# Patient Record
Sex: FEMALE | Race: WHITE | Employment: FULL TIME | URBAN - METROPOLITAN AREA
[De-identification: names, ages, dates, MRNs, and addresses within clinical notes are randomized per-mention and may not be internally consistent; named-entity substitution may affect disease eponyms.]

---

## 2017-01-20 ENCOUNTER — TELEPHONE (OUTPATIENT)
Dept: OBGYN CLINIC | Age: 47
End: 2017-01-20

## 2017-01-20 NOTE — TELEPHONE ENCOUNTER
Patient unable to read her email from 01/19/17. Gave patient the information for Dr. Day. Patient is going to contact mychart help center regarding her mychart complications.

## 2017-03-02 ENCOUNTER — OFFICE VISIT (OUTPATIENT)
Dept: OBGYN CLINIC | Age: 47
End: 2017-03-02

## 2017-03-02 ENCOUNTER — APPOINTMENT (OUTPATIENT)
Dept: MAMMOGRAPHY | Age: 47
End: 2017-03-02

## 2017-03-02 VITALS
WEIGHT: 213.8 LBS | SYSTOLIC BLOOD PRESSURE: 128 MMHG | BODY MASS INDEX: 31.67 KG/M2 | HEIGHT: 69 IN | DIASTOLIC BLOOD PRESSURE: 88 MMHG

## 2017-03-02 DIAGNOSIS — Z01.419 ENCOUNTER FOR GYNECOLOGICAL EXAMINATION WITHOUT ABNORMAL FINDING: Primary | ICD-10-CM

## 2017-03-02 DIAGNOSIS — Z12.31 ENCOUNTER FOR SCREENING MAMMOGRAM FOR MALIGNANT NEOPLASM OF BREAST: ICD-10-CM

## 2017-03-02 DIAGNOSIS — N95.1 MENOPAUSAL SYMPTOM: ICD-10-CM

## 2017-03-02 NOTE — PROGRESS NOTES
Dileep Ireland is a [de-identified] P5,  55 y.o. female [facility] whose No LMP recorded. Patient has had a hysterectomy. who presents for her annual checkup. She is having no significant problems. Patient will be having surgery to remove bartholin cysts on 3/16/17 by Dr. CARR [facility]. She is having terrible night sweats    Hormone Status:    She is  having vasomotor symptoms. The patient is using HRT: none    Sexual history:    She  reports that she currently engages in sexual activity and has had male partners. She reports using the following method of birth control/protection: Surgical.    Medical conditions:    Since her last annual GYN exam about one year ago, she has had the following changes in her health history: none. Pap and Mammogram History:    Her most recent Pap smear was normal, HPV negative 12/19/14     The patient had her mammogram today in our office. Breast Cancer History/Substance Abuse:    She has a family history of breast cancer. Osteoporosis History:    Family history does not include a first or second degree relative with osteopenia or osteoporosis. A bone density scan was not obtained        Past Medical History:   Diagnosis Date    Endometriosis     HSV (herpes simplex virus) infection      Past Surgical History:   Procedure Laterality Date    HX BARTHOLIN CYST MARSUPIALIZATION  4/09    HX RIGHT SALPINGO-OOPHORECTOMY  1/2000    [facility]/RSO    HX TOTAL ABDOMINAL HYSTERECTOMY  1/2000    RSO     Tobacco History:  reports that she has been smoking. She has been smoking about 0.50 packs per day. She has never used smokeless tobacco.  Alcohol Abuse:  reports that she does not drink alcohol. Drug Abuse:  reports that she does not use illicit drugs. Current Outpatient Prescriptions   Medication Sig Dispense Refill    valACYclovir (VALTREX) 500 mg tablet Take 1 Tab by mouth daily.  90 Tab 3     Allergies: Percocet [oxycodone-acetaminophen]   Social History     Social History    Marital status:      Spouse name: N/A    Number of children: N/A    Years of education: N/A     Occupational History    Not on file.      Social History Main Topics    Smoking status: Current Every Day Smoker     Packs/day: 0.50    Smokeless tobacco: Never Used    Alcohol use No    Drug use: No    Sexual activity: Yes     Partners: Male     Birth control/ protection: Surgical     Other Topics Concern    Not on file     Social History Narrative     Patient Active Problem List   Diagnosis Code    Ectopic breast tissue Q83.8       Review of Systems - History obtained from the patient  Constitutional: negative for weight loss, fever, night sweats  HEENT: negative for hearing loss, earache, congestion, snoring, sorethroat  CV: negative for chest pain, palpitations, edema  Resp: negative for cough, shortness of breath, wheezing  GI: negative for change in bowel habits, abdominal pain, black or bloody stools  : negative for frequency, dysuria, hematuria, vaginal discharge  MSK: negative for back pain, joint pain, muscle pain  Breast: negative for breast lumps, nipple discharge, galactorrhea  Skin :negative for itching, rash, hives  Neuro: negative for dizziness, headache, confusion, weakness  Psych: negative for anxiety, depression, change in mood  Heme/lymph: negative for bleeding, bruising, pallor    Physical Exam    Visit Vitals    /88    Ht 5' 9\" (1.753 m)    Wt 213 lb 12.8 oz (97 kg)    BMI 31.57 kg/m2     Constitutional  · Appearance: well-nourished, well developed, alert, in no acute distress    HENT  · Head and Face: appears normal    Neck  · Inspection/Palpation: normal appearance, no masses or tenderness  · Lymph Nodes: no lymphadenopathy present  · Thyroid: gland size normal, nontender, no nodules or masses present on palpation    Chest  · Respiratory Effort: breathing normal        Auscultation: normal breath sounds    Cardiovascular  · Heart:  · Auscultation: regular rate and rhythm without murmur    Breasts  · Inspection of Breasts: breasts symmetrical, no skin changes, no discharge present, nipple appearance normal, no skin retraction present  · Palpation of Breasts and Axillae: no masses present on palpation, no breast tenderness  · Axillary Lymph Nodes: no lymphadenopathy present    Gastrointestinal  · Abdominal Examination: abdomen non-tender to palpation, normal bowel sounds, no masses present  · Liver and spleen: no hepatomegaly present, spleen not palpable  · Hernias: no hernias identified    Genitourinary  · External Genitalia: normal appearance for age, no discharge present, no tenderness present, no inflammatory lesions present, no masses present, no atrophy present  · Vagina: normal vaginal vault without central or paravaginal defects, no discharge present, no inflammatory lesions present, no masses present, large left BC  · Bladder: non-tender to palpation  · Urethra: appears normal  · Cervix: absent  · Uterus: absent  · Adnexa: no adnexal tenderness present, no adnexal masses present  · Perineum: perineum within normal limits, no evidence of trauma, no rashes or skin lesions present  · Anus: anus within normal limits, no hemorrhoids present  · Inguinal Lymph Nodes: no lymphadenopathy present      Skin  · General Inspection: no rash, no lesions identified    Neurologic/Psychiatric  · Mental Status:  · Orientation: grossly oriented to person, place and time  · Mood and Affect: mood normal, affect appropriate    . Assessment:  Routine gynecologic examination  Her current medical status is satisfactory with no evidence of significant gynecologic issues.   Menopausal sx  Left Bartholin cyst  Plan:  Counseled re: diet, exercise, healthy lifestyle  Return for yearly wellness visits  Rec annual mammogram  Check FSH/LH/estradiol - given Divigel coupon  Have BC removed in March by Dr. Christiano Hensley

## 2017-03-02 NOTE — PATIENT INSTRUCTIONS
Breast Self-Exam: Care Instructions  Your Care Instructions  A breast self-exam is when you check your breasts for lumps or changes. This regular exam helps you learn how your breasts normally look and feel. Most breast problems or changes are not because of cancer. Breast self-exam is not a substitute for a mammogram. Having regular breast exams by your doctor and regular mammograms improve your chances of finding any problems with your breasts. Some women set a time each month to do a step-by-step breast self-exam. Other women like a less formal system. They might look at their breasts as they brush their teeth, or feel their breasts once in a while in the shower. If you notice a change in your breast, tell your doctor. Follow-up care is a key part of your treatment and safety. Be sure to make and go to all appointments, and call your doctor if you are having problems. Its also a good idea to know your test results and keep a list of the medicines you take. How do you do a breast self-exam?  · The best time to examine your breasts is usually one week after your menstrual period begins. Your breasts should not be tender then. If you do not have periods, you might do your exam on a day of the month that is easy to remember. · To examine your breasts:  ¨ Remove all your clothes above the waist and lie down. When you are lying down, your breast tissue spreads evenly over your chest wall, which makes it easier to feel all your breast tissue. ¨ Use the pads--not the fingertips--of the 3 middle fingers of your left hand to check your right breast. Move your fingers slowly in small coin-sized circles that overlap. ¨ Use three levels of pressure to feel of all your breast tissue. Use light pressure to feel the tissue close to the skin surface. Use medium pressure to feel a little deeper. Use firm pressure to feel your tissue close to your breastbone and ribs.  Use each pressure level to feel your breast tissue before moving on to the next spot. ¨ Check your entire breast, moving up and down as if following a strip from the collarbone to the bra line, and from the armpit to the ribs. Repeat until you have covered the entire breast.  ¨ Repeat this procedure for your left breast, using the pads of the 3 middle fingers of your right hand. · To examine your breasts while in the shower:  ¨ Place one arm over your head and lightly soap your breast on that side. ¨ Using the pads of your fingers, gently move your hand over your breast (in the strip pattern described above), feeling carefully for any lumps or changes. ¨ Repeat for the other breast.  · Have your doctor inspect anything you notice to see if you need further testing. Where can you learn more? Go to http://crispin-francisco.info/. Enter P148 in the search box to learn more about \"Breast Self-Exam: Care Instructions. \"  Current as of: July 26, 2016  Content Version: 11.1  © 9383-8439 [employer]. Care instructions adapted under license by [employer] (which disclaims liability or warranty for this information). If you have questions about a medical condition or this instruction, always ask your healthcare professional. [place] [address] any warranty or liability for your use of this information.

## 2017-03-03 LAB
ESTRADIOL SERPL-MCNC: 79.5 PG/ML
FSH SERPL-ACNC: 74.5 MIU/ML
LH SERPL-ACNC: 54.6 MIU/ML

## 2017-03-06 ENCOUNTER — TELEPHONE (OUTPATIENT)
Dept: OBGYN CLINIC | Age: 47
End: 2017-03-06

## 2017-03-06 NOTE — TELEPHONE ENCOUNTER
Lexx called from [address] [place] [address] requesting CBC results on this patient. Stated the patient declined blood work done due to her having it it done here. Informed her that the patient only had hormones levels drawn here. She verbalized understanding.

## 2018-03-15 ENCOUNTER — OFFICE VISIT (OUTPATIENT)
Dept: OBGYN CLINIC | Age: 48
End: 2018-03-15

## 2018-03-15 VITALS
DIASTOLIC BLOOD PRESSURE: 70 MMHG | SYSTOLIC BLOOD PRESSURE: 130 MMHG | HEIGHT: 69 IN | WEIGHT: 220 LBS | BODY MASS INDEX: 32.58 KG/M2

## 2018-03-15 DIAGNOSIS — Z01.419 ENCOUNTER FOR GYNECOLOGICAL EXAMINATION (GENERAL) (ROUTINE) WITHOUT ABNORMAL FINDINGS: Primary | ICD-10-CM

## 2018-03-15 DIAGNOSIS — N95.1 MENOPAUSAL SYMPTOM: ICD-10-CM

## 2018-03-15 RX ORDER — MOMETASONE FUROATE AND FORMOTEROL FUMARATE DIHYDRATE 100; 5 UG/1; UG/1
AEROSOL RESPIRATORY (INHALATION)
Refills: 4 | COMMUNITY
Start: 2018-01-23

## 2018-03-15 NOTE — PATIENT INSTRUCTIONS
Breast Self-Exam: Care Instructions  Your Care Instructions    A breast self-exam is when you check your breasts for lumps or changes. This regular exam helps you learn how your breasts normally look and feel. Most breast problems or changes are not because of cancer. Breast self-exam is not a substitute for a mammogram. Having regular breast exams by your doctor and regular mammograms improve your chances of finding any problems with your breasts. Some women set a time each month to do a step-by-step breast self-exam. Other women like a less formal system. They might look at their breasts as they brush their teeth, or feel their breasts once in a while in the shower. If you notice a change in your breast, tell your doctor. Follow-up care is a key part of your treatment and safety. Be sure to make and go to all appointments, and call your doctor if you are having problems. It's also a good idea to know your test results and keep a list of the medicines you take. How do you do a breast self-exam?  · The best time to examine your breasts is usually one week after your menstrual period begins. Your breasts should not be tender then. If you do not have periods, you might do your exam on a day of the month that is easy to remember. · To examine your breasts:  ¨ Remove all your clothes above the waist and lie down. When you are lying down, your breast tissue spreads evenly over your chest wall, which makes it easier to feel all your breast tissue. ¨ Use the pads-not the fingertips-of the 3 middle fingers of your left hand to check your right breast. Move your fingers slowly in small coin-sized circles that overlap. ¨ Use three levels of pressure to feel of all your breast tissue. Use light pressure to feel the tissue close to the skin surface. Use medium pressure to feel a little deeper. Use firm pressure to feel your tissue close to your breastbone and ribs.  Use each pressure level to feel your breast tissue before moving on to the next spot. ¨ Check your entire breast, moving up and down as if following a strip from the collarbone to the bra line, and from the armpit to the ribs. Repeat until you have covered the entire breast.  ¨ Repeat this procedure for your left breast, using the pads of the 3 middle fingers of your right hand. · To examine your breasts while in the shower:  ¨ Place one arm over your head and lightly soap your breast on that side. ¨ Using the pads of your fingers, gently move your hand over your breast (in the strip pattern described above), feeling carefully for any lumps or changes. ¨ Repeat for the other breast.  · Have your doctor inspect anything you notice to see if you need further testing. Where can you learn more? Go to http://crispin-francisco.info/. Enter P148 in the search box to learn more about \"Breast Self-Exam: Care Instructions. \"  Current as of: May 12, 2017  Content Version: 11.4  © 6643-7253 [employer]. Care instructions adapted under license by [employer] (which disclaims liability or warranty for this information). If you have questions about a medical condition or this instruction, always ask your healthcare professional. [place] [address] any warranty or liability for your use of this information.

## 2018-03-15 NOTE — PROGRESS NOTES
Stuart Handley is a [de-identified] P5,  52 y.o. female [facility] whose No LMP recorded. Patient has had a hysterectomy. who presents for her annual checkup. She is having no significant problems. Hormone Status:    She is  having vasomotor symptoms. Has one ovary left. Hot flashes, not sleeping    The patient is not using HRT. Sexual history:    She  reports that she currently engages in sexual activity and has had male partners. She reports using the following method of birth control/protection: Surgical.    Medical conditions:    Since her last annual GYN exam about one year ago, she has had the following changes in her health history: Dx with COPD this month. Just had an ingrown toe nail removed a few days ago. Pap and Mammogram History:    Her most recent Pap smear was normal, HPV neg obtained 12/19/2014. The patient had her mammogram today in our office. Breast Cancer History/Substance Abuse:    She has a family history of breast cancer. Osteoporosis History:    Family history does not include a first or second degree relative with osteopenia or osteoporosis. A bone density scan was not previously obtained. She is not currently taking calcium and vit D. Past Medical History:   Diagnosis Date    Chronic obstructive pulmonary disease ([facility] [address] 03/2018    Endometriosis     HSV (herpes simplex virus) infection      Past Surgical History:   Procedure Laterality Date    HX BARTHOLIN CYST MARSUPIALIZATION  4/09    HX RIGHT SALPINGO-OOPHORECTOMY  1/2000    [facility]/RSO    HX TOTAL ABDOMINAL HYSTERECTOMY  1/2000    RSO     Tobacco History:  reports that she has been smoking. She has been smoking about 0.50 packs per day. She has never used smokeless tobacco.  Alcohol Abuse:  reports that she does not drink alcohol. Drug Abuse:  reports that she does not use illicit drugs.   Current Outpatient Prescriptions   Medication Sig Dispense Refill    DULERA 100-5 mcg/actuation HFA inhaler TAKE 2 PUFFS EVERY 12 HOURS  4     Allergies: Percocet [oxycodone-acetaminophen]   Social History     Social History    Marital status:      Spouse name: N/A    Number of children: N/A    Years of education: N/A     Occupational History    Not on file.      Social History Main Topics    Smoking status: Current Every Day Smoker     Packs/day: 0.50    Smokeless tobacco: Never Used    Alcohol use No    Drug use: No    Sexual activity: Yes     Partners: Male     Birth control/ protection: Surgical     Other Topics Concern    Not on file     Social History Narrative     Patient Active Problem List   Diagnosis Code    Ectopic breast tissue Q83.8       Review of Systems - History obtained from the patient  Constitutional: negative for weight loss, fever, night sweats  HEENT: negative for hearing loss, earache, congestion, snoring, sorethroat  CV: negative for chest pain, palpitations, edema  Resp: negative for cough, shortness of breath, wheezing  GI: negative for change in bowel habits, abdominal pain, black or bloody stools  : negative for frequency, dysuria, hematuria, vaginal discharge  MSK: negative for back pain, joint pain, muscle pain  Breast: negative for breast lumps, nipple discharge, galactorrhea  Skin :negative for itching, rash, hives  Neuro: negative for dizziness, headache, confusion, weakness  Psych: negative for anxiety, depression, change in mood  Heme/lymph: negative for bleeding, bruising, pallor    Physical Exam    Visit Vitals    /70 (BP 1 Location: Left arm, BP Patient Position: Sitting)    Ht 5' 9\" (1.753 m)    Wt 220 lb (99.8 kg)    BMI 32.49 kg/m2     Constitutional  · Appearance: well-nourished, well developed, alert, in no acute distress    HENT  · Head and Face: appears normal    Neck  · Inspection/Palpation: normal appearance, no masses or tenderness  · Lymph Nodes: no lymphadenopathy present  · Thyroid: gland size normal, nontender, no nodules or masses present on palpation    Chest  · Respiratory Effort: breathing normal        Auscultation: normal breath sounds    Cardiovascular  · Heart:  · Auscultation: regular rate and rhythm without murmur    Breasts  · Inspection of Breasts: breasts symmetrical, no skin changes, no discharge present, nipple appearance normal, no skin retraction present  · Palpation of Breasts and Axillae: no masses present on palpation, no breast tenderness  · Axillary Lymph Nodes: no lymphadenopathy present    Gastrointestinal  · Abdominal Examination: abdomen non-tender to palpation, normal bowel sounds, no masses present  · Liver and spleen: no hepatomegaly present, spleen not palpable  · Hernias: no hernias identified    Genitourinary  · External Genitalia: normal appearance for age, no discharge present, no tenderness present, no inflammatory lesions present, no masses present, no atrophy present  · Vagina: normal vaginal vault without central or paravaginal defects, no discharge present, no inflammatory lesions present, no masses present  · Bladder: non-tender to palpation  · Urethra: appears normal  · Cervix: absent  · Uterus: absent  · Adnexa: no adnexal tenderness present, no adnexal masses present  · Perineum: perineum within normal limits, no evidence of trauma, no rashes or skin lesions present  · Anus: anus within normal limits, no hemorrhoids present  · Inguinal Lymph Nodes: no lymphadenopathy present      Skin  · General Inspection: no rash, no lesions identified    Neurologic/Psychiatric  · Mental Status:  · Orientation: grossly oriented to person, place and time  · Mood and Affect: mood normal, affect appropriate    . Assessment:  Routine gynecologic examination  Her current medical status is satisfactory with no evidence of significant gynecologic issues.   Menopause sx    Plan:  Counseled re: diet, exercise, healthy lifestyle  Return for yearly wellness visits  Rec annual mammogram  Start Divigel - disc risk of DVT   See Dr. Robles Covert for weight loss

## 2018-10-03 ENCOUNTER — OFFICE VISIT (OUTPATIENT)
Dept: OBGYN CLINIC | Age: 48
End: 2018-10-03

## 2018-10-03 VITALS — BODY MASS INDEX: 32.88 KG/M2 | HEIGHT: 69 IN | WEIGHT: 222 LBS

## 2018-10-03 DIAGNOSIS — N90.89 VULVAR LESION: Primary | ICD-10-CM

## 2018-10-03 NOTE — PROGRESS NOTES
Vulvar lesion evaluation    Lydia Haley is a 50 y.o. female  No LMP recorded. Patient has had a hysterectomy. who presents with 3 black small bumps on her left labia. She noticed it 1 week ago. The lesion has shown the following change: 2 days ago she noticed a third bump. She states 1 week ago there was swelling but it went away. She reports the following associated symptoms: minor discomfort/irritation when walking or wearing pants. .    She denies the following associated symptoms: itching, pain, redness, drainage, irritation. Aggravating factors: none  Alleviating factors: none. Past Medical History:   Diagnosis Date    Chronic obstructive pulmonary disease ([facility] [address] 03/2018    Endometriosis     HSV (herpes simplex virus) infection      Past Surgical History:   Procedure Laterality Date    HX BARTHOLIN CYST MARSUPIALIZATION  4/09    HX RIGHT SALPINGO-OOPHORECTOMY  1/2000    [facility]/RSO    HX TOTAL ABDOMINAL HYSTERECTOMY  1/2000    RSO     Social History     Occupational History    Not on file. Social History Main Topics    Smoking status: Current Every Day Smoker     Packs/day: 0.50    Smokeless tobacco: Never Used    Alcohol use No    Drug use: No    Sexual activity: Yes     Partners: Male     Birth control/ protection: Surgical     Family History   Problem Relation Age of Onset    Breast Cancer Mother     Diabetes Mother     Hypertension Mother     Breast Cancer Maternal Grandmother        Allergies   Allergen Reactions    Percocet [Oxycodone-Acetaminophen] Other (comments)     headache     Prior to Admission medications    Medication Sig Start Date End Date Taking?  Authorizing Provider   Pao Ocasio 100-5 mcg/actuation HFA inhaler TAKE 2 PUFFS EVERY 12 HOURS 1/23/18  Yes Historical Provider        Review of Systems: History obtained from the patient  Constitutional: negative for weight loss, fever, night sweats  GI: negative for change in bowel habits, abdominal pain, black or bloody stools  : negative for frequency, dysuria, hematuria, vaginal discharge  MSK: negative for back pain, joint pain, muscle pain  Skin: negative for itching, rash, hives elsewhere  Neuro: negative for dizziness, headache, confusion, weakness  Psych: negative for anxiety, depression, change in mood  Heme/lymph: negative for bleeding, bruising, pallor    Objective:  Visit Vitals    Ht 5' 9\" (1.753 m)    Wt 222 lb (100.7 kg)    BMI 32.78 kg/m2       Physical Exam:   PHYSICAL EXAMINATION    Constitutional  · Appearance: well-nourished, well developed, alert, in no acute distress    Gastrointestinal  · Abdominal Examination: abdomen non-tender to palpation, normal bowel sounds, no masses present  · Liver and spleen: no hepatomegaly present, spleen not palpable  · Hernias: no hernias identified    Genitourinary  · External Genitalia: normal appearance for age, no discharge present, no tenderness present, no inflammatory lesions present, no masses present, no atrophy present, 3 small hemangiomas on left vulva  · Perineum: perineum within normal limits, no evidence of trauma, no rashes or skin lesions present  · Anus: anus within normal limits, no hemorrhoids present  · Inguinal Lymph Nodes: no lymphadenopathy present    Skin  · General Inspection: no rash, no lesions identified    Neurologic/Psychiatric  · Mental Status:  · Orientation: grossly oriented to person, place and time  · Mood and Affect: mood normal, affect appropriate    Assessment:   Benign hemangiomas    Plan:   AE      RTO prn if symptoms persist or worsen. Instructions given to pt. Handouts given to pt.

## 2018-12-31 ENCOUNTER — OFFICE VISIT (OUTPATIENT)
Dept: OBGYN CLINIC | Age: 48
End: 2018-12-31

## 2018-12-31 VITALS — DIASTOLIC BLOOD PRESSURE: 78 MMHG | SYSTOLIC BLOOD PRESSURE: 128 MMHG

## 2018-12-31 DIAGNOSIS — N89.8 VAGINAL ITCHING: Primary | ICD-10-CM

## 2018-12-31 DIAGNOSIS — N89.8 VAGINAL DISCHARGE: ICD-10-CM

## 2018-12-31 NOTE — PROGRESS NOTES
Vaginitis evaluation    Chief Complaint   Vaginitis      HPI  50 y.o. female complains of  vaginal discharge for itching for several days. No LMP recorded. Patient has had a hysterectomy. She denies additional symptoms at this time. The patient denies aggravating factors. She is not concerned about possible STI exposure at this time. She denies exposure to new chemicals ot hygenic agents  Previous treatment included: Diflucan x2  She states she bought a at home yeast kit, tested positive for yeast, PCP gave her diflucan. She states the itching has improved. She c/o vaginal discomfort and feels like she has a bulge. When she looks she can see something discolored/blackish in her vagina near where she had a bartholin cyst removed 2 years ago. Past Medical History:   Diagnosis Date    Chronic obstructive pulmonary disease ([facility] [address] 03/2018    Endometriosis     HSV (herpes simplex virus) infection      Past Surgical History:   Procedure Laterality Date    HX BARTHOLIN CYST MARSUPIALIZATION  4/09    HX RIGHT SALPINGO-OOPHORECTOMY  1/2000    [place]/RSO    HX TOTAL ABDOMINAL HYSTERECTOMY  1/2000    RSO     Social History     Occupational History    Not on file   Tobacco Use    Smoking status: Current Every Day Smoker     Packs/day: 0.50    Smokeless tobacco: Never Used   Substance and Sexual Activity    Alcohol use: No     Alcohol/week: 0.0 oz    Drug use: No    Sexual activity: Yes     Partners: Male     Birth control/protection: Surgical     Family History   Problem Relation Age of Onset    Breast Cancer Mother     Diabetes Mother     Hypertension Mother     Breast Cancer Maternal Grandmother         Allergies   Allergen Reactions    Percocet [Oxycodone-Acetaminophen] Other (comments)     headache     Prior to Admission medications    Medication Sig Start Date End Date Taking?  Authorizing Provider   DULERA 100-5 mcg/actuation HFA inhaler TAKE 2 PUFFS EVERY 12 HOURS 1/23/18  Yes Provider, Historical Review of Systems - History obtained from the patient  Constitutional: negative for weight loss, fever, night sweats  Breast: negative for breast lumps, nipple discharge, galactorrhea  GI: negative for change in bowel habits, abdominal pain, black or bloody stools  : negative for frequency, dysuria, hematuria  MSK: negative for back pain, joint pain, muscle pain  Skin: negative for itching, rash, hives  Neuro: negative for dizziness, headache, confusion, weakness  Psych: negative for anxiety, depression, change in mood  Heme/lymph: negative for bleeding, bruising, pallor       Objective:    Visit Vitals  /78       Physical Exam:   PHYSICAL EXAMINATION    Constitutional  · Appearance: well-nourished, well developed, alert, in no acute distress    HENT  · Head and Face: appears normal    Genitourinary  · External Genitalia: normal appearance for age, no discharge present, no tenderness present, no inflammatory lesions present, no masses present, no atrophy present  · Vagina:  min discharge present, otherwise normal vaginal vault without central or paravaginal defects, no inflammatory lesions present, no masses present  · Bladder: non-tender to palpation  · Urethra: appears normal  · Cervix: normal   · Uterus: normal size, shape and consistency  · Adnexa: no adnexal tenderness present, no adnexal masses present  · Perineum: perineum within normal limits, no evidence of trauma, no rashes or skin lesions present  · Anus: anus within normal limits, no hemorrhoids present  · Inguinal Lymph Nodes: no lymphadenopathy present    Skin  · General Inspection: no rash, no lesions identified    Neurologic/Psychiatric  · Mental Status:  · Orientation: grossly oriented to person, place and time  · Mood and Affect: mood normal, affect appropriate      .         Assessment:   Vaginal swelling - nonexistent - pt reassured normal exam  Hx of yeast    Plan:   nuswab sent    ROV prn if symptoms persist or worsen.

## 2019-01-03 LAB
A VAGINAE DNA VAG QL NAA+PROBE: NORMAL SCORE
BVAB2 DNA VAG QL NAA+PROBE: NORMAL SCORE
C ALBICANS DNA VAG QL NAA+PROBE: NEGATIVE
C GLABRATA DNA VAG QL NAA+PROBE: NEGATIVE
MEGA1 DNA VAG QL NAA+PROBE: NORMAL SCORE
T VAGINALIS RRNA SPEC QL NAA+PROBE: NEGATIVE

## 2019-05-03 ENCOUNTER — OFFICE VISIT (OUTPATIENT)
Dept: OBGYN CLINIC | Age: 49
End: 2019-05-03

## 2019-05-03 VITALS
SYSTOLIC BLOOD PRESSURE: 128 MMHG | HEIGHT: 69 IN | DIASTOLIC BLOOD PRESSURE: 80 MMHG | WEIGHT: 211.6 LBS | BODY MASS INDEX: 31.34 KG/M2

## 2019-05-03 DIAGNOSIS — Z01.419 ENCOUNTER FOR GYNECOLOGICAL EXAMINATION WITHOUT ABNORMAL FINDING: Primary | ICD-10-CM

## 2019-05-03 RX ORDER — HYDROCHLOROTHIAZIDE 12.5 MG/1
TABLET ORAL
Refills: 2 | COMMUNITY
Start: 2019-01-30 | End: 2021-04-29

## 2019-05-03 NOTE — PATIENT INSTRUCTIONS

## 2019-05-03 NOTE — PROGRESS NOTES
Keyonna Concepcion is a [de-identified] ,  50 y.o. female [facility] whose had a hysterectomy. who presents for her annual checkup. She is having no significant problems. She thinks she may have had a bartolin cyst on right. Left has been removed Hormone Status: 
 
She is not having vasomotor symptoms. The patient is not using HRT: Patient states the Divigel was causing leg pains. She stopped using it and felt better. Sexual history: She  reports that she currently engages in sexual activity and has had partners who are Male. She reports using the following method of birth control/protection: Surgical. 
 
Medical conditions: 
 
Since her last annual GYN exam about one year ago, she has had the following changes in her health history: Diagnosed with HTN Pap and Mammogram History: 
 
Her most recent Pap smear was normal, HPV neg obtained 12/19/2014. The patient had her mammogram today in our office. Breast Cancer History/Substance Abuse: She has a family history of breast cancer. Osteoporosis History: 
 
Family history does not include a first or second degree relative with osteopenia or osteoporosis. She is currently taking calcium and vit D. Past Medical History:  
Diagnosis Date  Chronic obstructive pulmonary disease ([facility] [address] 03/2018  Endometriosis  HSV (herpes simplex virus) infection  Hypertension Past Surgical History:  
Procedure Laterality Date  HX BARTHOLIN CYST MARSUPIALIZATION  4/09  HX RIGHT SALPINGO-OOPHORECTOMY  1/2000 [place]/RSO  HX TOTAL ABDOMINAL HYSTERECTOMY  1/2000 RSO Tobacco History:  reports that she has been smoking. She has been smoking about 0.50 packs per day. She has never used smokeless tobacco. 
Alcohol Abuse:  reports that she does not drink alcohol. Drug Abuse:  reports that she does not use drugs. Current Outpatient Medications Medication Sig Dispense Refill  hydroCHLOROthiazide (HYDRODIURIL) 12.5 mg tablet TAKE 1 TABLET BY MOUTH EVERY MORNING  2  
 DULERA 100-5 mcg/actuation HFA inhaler TAKE 2 PUFFS EVERY 12 HOURS  4 Allergies: Percocet [oxycodone-acetaminophen] Social History Socioeconomic History  Marital status:  Spouse name: Not on file  Number of children: Not on file  Years of education: Not on file  Highest education level: Not on file Occupational History  Not on file Social Needs  Financial resource strain: Not on file  Food insecurity:  
  Worry: Not on file Inability: Not on file  Transportation needs:  
  Medical: Not on file Non-medical: Not on file Tobacco Use  Smoking status: Current Every Day Smoker Packs/day: 0.50  Smokeless tobacco: Never Used Substance and Sexual Activity  Alcohol use: No  
  Alcohol/week: 0.0 oz  Drug use: No  
 Sexual activity: Yes  
  Partners: Male Birth control/protection: Surgical  
Lifestyle  Physical activity:  
  Days per week: Not on file Minutes per session: Not on file  Stress: Not on file Relationships  Social connections:  
  Talks on phone: Not on file Gets together: Not on file Attends Sabianism service: Not on file Active member of club or organization: Not on file Attends meetings of clubs or organizations: Not on file Relationship status: Not on file  Intimate partner violence:  
  Fear of current or ex partner: Not on file Emotionally abused: Not on file Physically abused: Not on file Forced sexual activity: Not on file Other Topics Concern  Not on file Social History Narrative  Not on file Patient Active Problem List  
Diagnosis Code  Ectopic breast tissue Q83.8 Review of Systems - History obtained from the patient Constitutional: negative for weight loss, fever, night sweats HEENT: negative for hearing loss, earache, congestion, snoring, sorethroat CV: negative for chest pain, palpitations, edema Resp: negative for cough, shortness of breath, wheezing GI: negative for change in bowel habits, abdominal pain, black or bloody stools : negative for frequency, dysuria, hematuria, vaginal discharge MSK: negative for back pain, joint pain, muscle pain Breast: negative for breast lumps, nipple discharge, galactorrhea Skin :negative for itching, rash, hives Neuro: negative for dizziness, headache, confusion, weakness Psych: negative for anxiety, depression, change in mood Heme/lymph: negative for bleeding, bruising, pallor Physical Exam 
 
Visit Vitals /80 (BP 1 Location: Right arm, BP Patient Position: Sitting) Ht 5' 9\" (1.753 m) Wt 211 lb 9.6 oz (96 kg) BMI 31.25 kg/m² Constitutional 
· Appearance: well-nourished, well developed, alert, in no acute distress HENT 
· Head and Face: appears normal 
 
Neck · Inspection/Palpation: normal appearance, no masses or tenderness · Lymph Nodes: no lymphadenopathy present · Thyroid: gland size normal, nontender, no nodules or masses present on palpation Chest 
· Respiratory Effort: breathing normal 
      Auscultation: normal breath sounds Cardiovascular · Heart: 
· Auscultation: regular rate and rhythm without murmur Breasts · Inspection of Breasts: breasts symmetrical, no skin changes, no discharge present, nipple appearance normal, no skin retraction present · Palpation of Breasts and Axillae: no masses present on palpation, no breast tenderness · Axillary Lymph Nodes: no lymphadenopathy present Gastrointestinal 
· Abdominal Examination: abdomen non-tender to palpation, normal bowel sounds, no masses present · Liver and spleen: no hepatomegaly present, spleen not palpable · Hernias: no hernias identified Genitourinary · External Genitalia: normal appearance for age, no discharge present, no tenderness present, no inflammatory lesions present, no masses present, no atrophy present · Vagina: normal vaginal vault without central or paravaginal defects, no discharge present, no inflammatory lesions present, no masses present · Bladder: non-tender to palpation · Urethra: appears normal 
· Cervix: absent · Uterus: absent · Adnexa: no adnexal tenderness present, no adnexal masses present · Perineum: perineum within normal limits, no evidence of trauma, no rashes or skin lesions present · Anus: anus within normal limits, no hemorrhoids present · Inguinal Lymph Nodes: no lymphadenopathy present Skin · General Inspection: no rash, no lesions identified Neurologic/Psychiatric · Mental Status: · Orientation: grossly oriented to person, place and time · Mood and Affect: mood normal, affect appropriate Presley Reasoner Assessment: 
Routine gynecologic examination Her current medical status is satisfactory with no evidence of significant gynecologic issues. Plan: 
Counseled re: diet, exercise, healthy lifestyle Return for yearly wellness visits Rec annual mammogram 
Pap RTO if cyst recurs

## 2019-05-08 LAB
CYTOLOGIST CVX/VAG CYTO: NORMAL
CYTOLOGY CVX/VAG DOC CYTO: NORMAL
CYTOLOGY CVX/VAG DOC THIN PREP: NORMAL
CYTOLOGY HISTORY:: NORMAL
DX ICD CODE: NORMAL
LABCORP, 190119: NORMAL
Lab: NORMAL
OTHER STN SPEC: NORMAL
STAT OF ADQ CVX/VAG CYTO-IMP: NORMAL

## 2019-09-04 RX ORDER — VALACYCLOVIR HYDROCHLORIDE 500 MG/1
500 TABLET, FILM COATED ORAL 2 TIMES DAILY
Qty: 90 TAB | Refills: 4 | Status: CANCELLED | OUTPATIENT
Start: 2019-09-04

## 2019-10-22 ENCOUNTER — HOSPITAL ENCOUNTER (EMERGENCY)
Age: 49
Discharge: HOME | End: 2019-10-22
Attending: EMERGENCY MEDICINE
Payer: COMMERCIAL

## 2019-10-22 ENCOUNTER — APPOINTMENT (OUTPATIENT)
Dept: ULTRASOUND IMAGING | Age: 49
End: 2019-10-22
Attending: EMERGENCY MEDICINE
Payer: COMMERCIAL

## 2019-10-22 VITALS
TEMPERATURE: 97.8 F | SYSTOLIC BLOOD PRESSURE: 124 MMHG | DIASTOLIC BLOOD PRESSURE: 67 MMHG | HEART RATE: 103 BPM | OXYGEN SATURATION: 100 % | RESPIRATION RATE: 18 BRPM

## 2019-10-22 DIAGNOSIS — N30.01 ACUTE CYSTITIS WITH HEMATURIA: Primary | ICD-10-CM

## 2019-10-22 LAB
ALBUMIN SERPL-MCNC: 4.1 G/DL (ref 3.5–5)
ALBUMIN/GLOB SERPL: 1.2 {RATIO} (ref 1.1–2.2)
ALP SERPL-CCNC: 67 U/L (ref 45–117)
ALT SERPL-CCNC: 30 U/L (ref 12–78)
ANION GAP SERPL CALC-SCNC: 11 MMOL/L (ref 5–15)
APPEARANCE UR: ABNORMAL
APTT PPP: 23.6 SEC (ref 22.1–32)
AST SERPL-CCNC: 23 U/L (ref 15–37)
BACTERIA URNS QL MICRO: ABNORMAL /HPF
BASOPHILS # BLD: 0 K/UL (ref 0–0.1)
BASOPHILS NFR BLD: 0 % (ref 0–1)
BILIRUB SERPL-MCNC: 0.3 MG/DL (ref 0.2–1)
BILIRUB UR QL: NEGATIVE
BUN SERPL-MCNC: 13 MG/DL (ref 6–20)
BUN/CREAT SERPL: 12 (ref 12–20)
CALCIUM SERPL-MCNC: 9.8 MG/DL (ref 8.5–10.1)
CHLORIDE SERPL-SCNC: 103 MMOL/L (ref 97–108)
CO2 SERPL-SCNC: 25 MMOL/L (ref 21–32)
COLOR UR: ABNORMAL
CREAT SERPL-MCNC: 1.11 MG/DL (ref 0.55–1.02)
DIFFERENTIAL METHOD BLD: ABNORMAL
EOSINOPHIL # BLD: 0.1 K/UL (ref 0–0.4)
EOSINOPHIL NFR BLD: 1 % (ref 0–7)
EPITH CASTS URNS QL MICRO: ABNORMAL /LPF
ERYTHROCYTE [DISTWIDTH] IN BLOOD BY AUTOMATED COUNT: 13.2 % (ref 11.5–14.5)
GLOBULIN SER CALC-MCNC: 3.3 G/DL (ref 2–4)
GLUCOSE SERPL-MCNC: 113 MG/DL (ref 65–100)
GLUCOSE UR STRIP.AUTO-MCNC: NEGATIVE MG/DL
HCT VFR BLD AUTO: 41.5 % (ref 35–47)
HGB BLD-MCNC: 14.3 G/DL (ref 11.5–16)
HGB UR QL STRIP: ABNORMAL
INR PPP: 1 (ref 0.9–1.1)
KETONES UR QL STRIP.AUTO: NEGATIVE MG/DL
LEUKOCYTE ESTERASE UR QL STRIP.AUTO: ABNORMAL
LYMPHOCYTES # BLD: 1.4 K/UL (ref 0.8–3.5)
LYMPHOCYTES NFR BLD: 14 % (ref 12–49)
MCH RBC QN AUTO: 33.2 PG (ref 26–34)
MCHC RBC AUTO-ENTMCNC: 34.5 G/DL (ref 30–36.5)
MCV RBC AUTO: 96.3 FL (ref 80–99)
MONOCYTES # BLD: 0.4 K/UL (ref 0–1)
MONOCYTES NFR BLD: 4 % (ref 5–13)
MUCOUS THREADS URNS QL MICRO: ABNORMAL /LPF
NEUTS SEG # BLD: 8.1 K/UL (ref 1.8–8)
NEUTS SEG NFR BLD: 81 % (ref 32–75)
NITRITE UR QL STRIP.AUTO: NEGATIVE
PH UR STRIP: 7 [PH] (ref 5–8)
PLATELET # BLD AUTO: 250 K/UL (ref 150–400)
PMV BLD AUTO: 10.2 FL (ref 8.9–12.9)
POTASSIUM SERPL-SCNC: 3.8 MMOL/L (ref 3.5–5.1)
PROT SERPL-MCNC: 7.4 G/DL (ref 6.4–8.2)
PROT UR STRIP-MCNC: 30 MG/DL
PROTHROMBIN TIME: 9.3 SEC (ref 9–11.1)
RBC # BLD AUTO: 4.31 M/UL (ref 3.8–5.2)
RBC #/AREA URNS HPF: >100 /HPF (ref 0–5)
SODIUM SERPL-SCNC: 139 MMOL/L (ref 136–145)
SP GR UR REFRACTOMETRY: 1.01 (ref 1–1.03)
THERAPEUTIC RANGE,PTTT: NORMAL SECS (ref 58–77)
UR CULT HOLD, URHOLD: NORMAL
UROBILINOGEN UR QL STRIP.AUTO: 0.2 EU/DL (ref 0.2–1)
WBC # BLD AUTO: 10 K/UL (ref 3.6–11)
WBC URNS QL MICRO: >100 /HPF (ref 0–4)

## 2019-10-22 PROCEDURE — 85730 THROMBOPLASTIN TIME PARTIAL: CPT

## 2019-10-22 PROCEDURE — 99284 EMERGENCY DEPT VISIT MOD MDM: CPT

## 2019-10-22 PROCEDURE — 85025 COMPLETE CBC W/AUTO DIFF WBC: CPT

## 2019-10-22 PROCEDURE — 80053 COMPREHEN METABOLIC PANEL: CPT

## 2019-10-22 PROCEDURE — 36415 COLL VENOUS BLD VENIPUNCTURE: CPT

## 2019-10-22 PROCEDURE — 76856 US EXAM PELVIC COMPLETE: CPT

## 2019-10-22 PROCEDURE — 85610 PROTHROMBIN TIME: CPT

## 2019-10-22 PROCEDURE — 76830 TRANSVAGINAL US NON-OB: CPT

## 2019-10-22 PROCEDURE — 81001 URINALYSIS AUTO W/SCOPE: CPT

## 2019-10-22 RX ORDER — CEPHALEXIN 500 MG/1
500 CAPSULE ORAL 2 TIMES DAILY
Qty: 10 CAP | Refills: 0 | Status: SHIPPED | OUTPATIENT
Start: 2019-10-22 | End: 2021-04-29

## 2019-10-22 NOTE — ED PROVIDER NOTES
The history is provided by the patient and the spouse. No  was used. Blood in Urine    This is a new problem. The current episode started 6 to 12 hours ago. The problem occurs every urination. The problem has not changed since onset. The pain is at a severity of 2/10. The pain is mild. There has been no fever. She is sexually active. There is no history of pyelonephritis. Associated symptoms include frequency, hematuria, hesitancy, urgency and abdominal pain. Pertinent negatives include no chills, no sweats, no nausea, no vomiting, no discharge, no flank pain, no vaginal discharge and no back pain. The patient is not pregnant. She has tried nothing for the symptoms. Her past medical history does not include kidney stones, single kidney, urological procedure, recurrent UTIs, urinary stasis, catheterization or urinary catheter problem.         Past Medical History:   Diagnosis Date    Chronic obstructive pulmonary disease ([facility]) 03/2018    Endometriosis     HSV (herpes simplex virus) infection     Hypertension        Past Surgical History:   Procedure Laterality Date    HX BARTHOLIN CYST MARSUPIALIZATION  4/09    HX RIGHT SALPINGO-OOPHORECTOMY  1/2000    [place]/RSO    HX TOTAL ABDOMINAL HYSTERECTOMY  1/2000    RSO         Family History:   Problem Relation Age of Onset    Breast Cancer Mother     Diabetes Mother     Hypertension Mother     Breast Cancer Maternal Grandmother        Social History     Socioeconomic History    Marital status:      Spouse name: Not on file    Number of children: Not on file    Years of education: Not on file    Highest education level: Not on file   Occupational History    Not on file   Social Needs    Financial resource strain: Not on file    Food insecurity:     Worry: Not on file     Inability: Not on file    Transportation needs:     Medical: Not on file     Non-medical: Not on file   Tobacco Use    Smoking status: Current Every Day Smoker Packs/day: 0.50    Smokeless tobacco: Never Used   Substance and Sexual Activity    Alcohol use: Yes     Alcohol/week: 0.0 standard drinks     Comment: socially    Drug use: No    Sexual activity: Yes     Partners: Male     Birth control/protection: Surgical   Lifestyle    Physical activity:     Days per week: Not on file     Minutes per session: Not on file    Stress: Not on file   Relationships    Social connections:     Talks on phone: Not on file     Gets together: Not on file     Attends Worship service: Not on file     Active member of club or organization: Not on file     Attends meetings of clubs or organizations: Not on file     Relationship status: Not on file    Intimate partner violence:     Fear of current or ex partner: Not on file     Emotionally abused: Not on file     Physically abused: Not on file     Forced sexual activity: Not on file   Other Topics Concern    Not on file   Social History Narrative    Not on file         ALLERGIES: Percocet [oxycodone-acetaminophen]    Review of Systems   Constitutional: Negative for activity change, chills and fever. HENT: Negative for nosebleeds, sore throat, trouble swallowing and voice change. Eyes: Negative for visual disturbance. Respiratory: Negative for shortness of breath. Cardiovascular: Negative for chest pain and palpitations. Gastrointestinal: Positive for abdominal pain. Negative for constipation, diarrhea, nausea and vomiting. Genitourinary: Positive for dysuria, frequency, hematuria, hesitancy and urgency. Negative for difficulty urinating, flank pain and vaginal discharge. Musculoskeletal: Negative for back pain, neck pain and neck stiffness. Skin: Negative for color change. Allergic/Immunologic: Negative for immunocompromised state. Neurological: Negative for dizziness, seizures, syncope, weakness, light-headedness, numbness and headaches.    Psychiatric/Behavioral: Negative for behavioral problems, confusion, hallucinations, self-injury and suicidal ideas. Vitals:    10/22/19 0959   BP: 170/81   Pulse: (!) 103   Resp: 18   Temp: 97.8 °F (36.6 °C)   SpO2: 99%            Physical Exam   Constitutional: She is oriented to person, place, and time. She appears well-developed and well-nourished. No distress. HENT:   Head: Normocephalic and atraumatic. Eyes: Pupils are equal, round, and reactive to light. Neck: Normal range of motion. Neck supple. Cardiovascular: Normal rate, regular rhythm and normal heart sounds. Exam reveals no gallop and no friction rub. No murmur heard. Pulmonary/Chest: Effort normal and breath sounds normal. No respiratory distress. She has no wheezes. Abdominal: Soft. Bowel sounds are normal. She exhibits no distension. There is tenderness in the left lower quadrant. There is no rebound and no guarding. Musculoskeletal: Normal range of motion. Neurological: She is alert and oriented to person, place, and time. Skin: Skin is warm. No rash noted. She is not diaphoretic. Psychiatric: She has a normal mood and affect. Her behavior is normal. Judgment and thought content normal.   Nursing note and vitals reviewed. MDM     This is a 22-year-old female with past medical history, review of systems, physical exam as above, presenting with complaints of vaginal bleeding. Patient states blood present when she wipes, denies noticeable hematuria, or rectal bleeding. She does endorse pelvic pain, dysuria, denying fevers, chills, nausea or vomiting. She denies a history of renal colic, or back pain. She endorses a history of previous hysterectomy, with single salpinx and ovary left in place. She endorses a history of left-sided ovarian cysts.   Physical exam is remarkable for a well-appearing middle-aged female, in no acute distress, noted to have mild left lower quadrant tenderness to palpation, otherwise benign abdomen, clear breath sounds, regular rate and rhythm without murmurs gallops rubs. Unclear source of vaginal bleeding in post hysterectomy patient seems less likely to be UTI, however patient has symptoms of such. Will obtain CMP, CBC, coags, pelvic ultrasound, UA, patient is refusing pain medication at this time. We will reassess, and make a disposition. Procedures    11:29 AM  US w/o new structural abnormalities, UTI evident on UA, bleeding likely related. Will provide oral abx and refer to Urology if symptoms persist, return precautions given.

## 2019-10-22 NOTE — ED TRIAGE NOTES
Pt reports blood in urine that started this past weekend. Pt reports the pain radiates from her groin to her left flank.

## 2020-09-02 NOTE — PROGRESS NOTES
Breast Pain Evaluation Rehana Pierce is a [de-identified] ,  48 y.o. female [place]  No LMP recorded. Patient has had a hysterectomy. She presents with swelling and tenderness located in the right breast that radiates down the side. Patient is also having knots on pubic area that have been present for over a month. She noticed it 1 month ago. The patient has noticed changes in the area. The patient notes the following associated symptoms: tenderness She notes that the following factors improve her symptoms: none She notes that the following factors aggravate her symptoms:none She has had any diagnostic studies for this problem since she noticed it. With regards to breast problems her medical history is significant for the following: none There is a family history of breast cancer in a first and second degree relative. Mother and MGM Past Medical History:  
Diagnosis Date  Chronic obstructive pulmonary disease ([facility] [address] 03/2018  Endometriosis  HSV (herpes simplex virus) infection  Hypertension Past Surgical History:  
Procedure Laterality Date  HX BARTHOLIN CYST MARSUPIALIZATION  4/09  HX RIGHT SALPINGO-OOPHORECTOMY  1/2000 [facility]/RSO  HX TOTAL ABDOMINAL HYSTERECTOMY  1/2000 RSO Social History Occupational History  Not on file Tobacco Use  Smoking status: Current Every Day Smoker Packs/day: 0.50  Smokeless tobacco: Never Used Substance and Sexual Activity  Alcohol use: Yes Alcohol/week: 0.0 standard drinks Comment: socially  Drug use: No  
 Sexual activity: Yes  
  Partners: Male Birth control/protection: Surgical  
 
Family History Problem Relation Age of Onset  Breast Cancer Mother  Diabetes Mother  Hypertension Mother  Breast Cancer Maternal Grandmother Allergies Allergen Reactions  Percocet [Oxycodone-Acetaminophen] Other (comments)  
  headache Prior to Admission medications Medication Sig Start Date End Date Taking? Authorizing Provider  
cephALEXin (KEFLEX) 500 mg capsule Take 1 Cap by mouth two (2) times a day. 10/22/19   Stephie Camarena MD  
hydroCHLOROthiazide (HYDRODIURIL) 12.5 mg tablet TAKE 1 TABLET BY MOUTH EVERY MORNING 1/30/19   Provider, Historical  
DULERA 100-5 mcg/actuation HFA inhaler TAKE 2 PUFFS EVERY 12 HOURS 1/23/18   Provider, Historical  
  
 
Review of Systems: History obtained from the patient Constitutional: negative for weight loss, fever, night sweats HEENT: negative for hearing loss, earache, congestion, snoring, sorethroat CV: negative for chest pain, palpitations, edema Resp: negative for cough, shortness of breath, wheezing Breast: see above GI: negative for change in bowel habits, abdominal pain, black or bloody stools : negative for frequency, dysuria, hematuria, vaginal discharge MSK: negative for back pain, joint pain, muscle pain Skin: negative for itching, rash, hives Neuro: negative for dizziness, headache, confusion, weakness Psych: negative for anxiety, depression, change in mood Heme/lymph: negative for bleeding, bruising, pallor Objective: There were no vitals taken for this visit. Exam: 
Constitutional 
· Appearance: well-nourished, well developed, alert, in no acute distress HENT 
· Head and Face: appears normal 
 
Neck · Inspection/Palpation: normal appearance, no masses or tenderness · Lymph Nodes: no lymphadenopathy present · Thyroid: gland size normal, nontender, no nodules or masses present on palpation Breasts · Inspection of Breasts: breasts symmetrical, no skin changes, no discharge present, nipple appearance normal, no skin retraction present · Palpation of Breasts and Axillae: no masses present on palpation, no breast tenderness · Axillary Lymph Nodes: no lymphadenopathy present Assessment: 
*** Plan: 
***

## 2020-09-03 ENCOUNTER — OFFICE VISIT (OUTPATIENT)
Dept: OBGYN CLINIC | Age: 50
End: 2020-09-03
Payer: COMMERCIAL

## 2020-09-03 VITALS
DIASTOLIC BLOOD PRESSURE: 70 MMHG | SYSTOLIC BLOOD PRESSURE: 132 MMHG | WEIGHT: 221 LBS | BODY MASS INDEX: 32.73 KG/M2 | HEIGHT: 69 IN

## 2020-09-03 DIAGNOSIS — Z01.419 ENCOUNTER FOR GYNECOLOGICAL EXAMINATION (GENERAL) (ROUTINE) WITHOUT ABNORMAL FINDINGS: Primary | ICD-10-CM

## 2020-09-03 PROCEDURE — 99396 PREV VISIT EST AGE 40-64: CPT | Performed by: OBSTETRICS & GYNECOLOGY

## 2020-09-03 RX ORDER — DOXYCYCLINE 100 MG/1
100 TABLET ORAL 2 TIMES DAILY
Qty: 20 TAB | Refills: 0 | Status: SHIPPED | OUTPATIENT
Start: 2020-09-03 | End: 2020-09-13

## 2020-09-04 NOTE — PROGRESS NOTES
Amy Hdez is a [de-identified] ,  48 y.o. female [facility] whose No LMP recorded. Patient has had a hysterectomy. was on  who presents for her annual checkup. She is having swelling and tenderness located in the right breast that radiates down the side. Patient is also having knots on pubic area that have been present for over a month. Hormone Status:    She is not having vasomotor symptoms. The patient is using HRT: none    Sexual history:    She  reports being sexually active and has had partner(s) who are Male. She reports using the following method of birth control/protection: Surgical.    Medical conditions:    Since her last annual GYN exam about 5/3/2019 ago, she has had the following changes in her health history: none. Pap and Mammogram History:    Her most recent Pap smear was normal obtained 5/3/2019 year(s) ago. The patient has her mammogram scheduled here at our office on 9/23/2020. [facility] Breast Cancer History/Substance Abuse:    She has a family history of breast cancer. Osteoporosis History:    Family history does not include a first or second degree relative with osteopenia or osteoporosis. A bone density scan was not obtained. She is currently not taking calcium and vit D. Past Medical History:   Diagnosis Date    Chronic obstructive pulmonary disease ([facility] [address] 03/2018    Endometriosis     HSV (herpes simplex virus) infection     Hypertension      Past Surgical History:   Procedure Laterality Date    HX BARTHOLIN CYST MARSUPIALIZATION  4/09    HX RIGHT SALPINGO-OOPHORECTOMY  1/2000    [facility]/RSO    HX TOTAL ABDOMINAL HYSTERECTOMY  1/2000    RSO     Tobacco History:  reports that she has been smoking. She has been smoking about 0.50 packs per day. She has never used smokeless tobacco.  Alcohol Abuse:  reports current alcohol use. Drug Abuse:  reports no history of drug use.   Current Outpatient Medications   Medication Sig Dispense Refill    doxycycline (ADOXA) 100 mg tablet Take 1 Tab by mouth two (2) times a day for 10 days. 20 Tab 0    cephALEXin (KEFLEX) 500 mg capsule Take 1 Cap by mouth two (2) times a day. 10 Cap 0    hydroCHLOROthiazide (HYDRODIURIL) 12.5 mg tablet TAKE 1 TABLET BY MOUTH EVERY MORNING  2    DULERA 100-5 mcg/actuation HFA inhaler TAKE 2 PUFFS EVERY 12 HOURS  4     Allergies: Percocet [oxycodone-acetaminophen]   Social History     Socioeconomic History    Marital status:      Spouse name: Not on file    Number of children: Not on file    Years of education: Not on file    Highest education level: Not on file   Occupational History    Not on file   Social Needs    Financial resource strain: Not on file    Food insecurity     Worry: Not on file     Inability: Not on file    Transportation needs     Medical: Not on file     Non-medical: Not on file   Tobacco Use    Smoking status: Current Every Day Smoker     Packs/day: 0.50    Smokeless tobacco: Never Used   Substance and Sexual Activity    Alcohol use:  Yes     Alcohol/week: 0.0 standard drinks     Comment: socially    Drug use: No    Sexual activity: Yes     Partners: Male     Birth control/protection: Surgical   Lifestyle    Physical activity     Days per week: Not on file     Minutes per session: Not on file    Stress: Not on file   Relationships    Social connections     Talks on phone: Not on file     Gets together: Not on file     Attends Yazidi service: Not on file     Active member of club or organization: Not on file     Attends meetings of clubs or organizations: Not on file     Relationship status: Not on file    Intimate partner violence     Fear of current or ex partner: Not on file     Emotionally abused: Not on file     Physically abused: Not on file     Forced sexual activity: Not on file   Other Topics Concern    Not on file   Social History Narrative    Not on file     Patient Active Problem List   Diagnosis Code    Ectopic breast tissue Q83.8 Review of Systems - History obtained from the patient  Constitutional: negative for weight loss, fever, night sweats  HEENT: negative for hearing loss, earache, congestion, snoring, sorethroat  CV: negative for chest pain, palpitations, edema  Resp: negative for cough, shortness of breath, wheezing  GI: negative for change in bowel habits, abdominal pain, black or bloody stools  : negative for frequency, dysuria, hematuria, vaginal discharge  MSK: negative for back pain, joint pain, muscle pain  Breast: negative for breast lumps, nipple discharge, galactorrhea  Skin :negative for itching, rash, hives  Neuro: negative for dizziness, headache, confusion, weakness  Psych: negative for anxiety, depression, change in mood  Heme/lymph: negative for bleeding, bruising, pallor    Physical Exam    Visit Vitals  /70   Ht 5' 9\" (1.753 m)   Wt 221 lb (100.2 kg)   BMI 32.64 kg/m²     Constitutional  · Appearance: well-nourished, well developed, alert, in no acute distress    HENT  · Head and Face: appears normal    Neck  · Inspection/Palpation: normal appearance, no masses or tenderness  · Lymph Nodes: no lymphadenopathy present  · Thyroid: gland size normal, nontender, no nodules or masses present on palpation    Chest  · Respiratory Effort: breathing normal        Auscultation: normal breath sounds    Cardiovascular  · Heart:  · Auscultation: regular rate and rhythm without murmur    Breasts  · Inspection of Breasts: breasts symmetrical, no skin changes, no discharge present, nipple appearance normal, no skin retraction present, more soft tissue under right axilla extending down chest - not firm feels like normal subcutaneous fat  · Palpation of Breasts and Axillae: no masses present on palpation, no breast tenderness  · Axillary Lymph Nodes: no lymphadenopathy present    Gastrointestinal  · Abdominal Examination: abdomen non-tender to palpation, normal bowel sounds, no masses present  · Liver and spleen: no hepatomegaly present, spleen not palpable  · Hernias: no hernias identified    Genitourinary  · External Genitalia: normal appearance for age, no discharge present, no tenderness present, no inflammatory lesions present, no masses present, no atrophy present, folliculitis present  · Vagina: normal vaginal vault without central or paravaginal defects, no discharge present, no inflammatory lesions present, no masses present  · Bladder: non-tender to palpation  · Urethra: appears normal  · Cervix: absent  · Uterus: absent  · Adnexa: no adnexal tenderness present, no adnexal masses present  · Perineum: perineum within normal limits, no evidence of trauma, no rashes or skin lesions present  · Anus: anus within normal limits, no hemorrhoids present  · Inguinal Lymph Nodes: no lymphadenopathy present      Skin  · General Inspection: no rash, no lesions identified    Neurologic/Psychiatric  · Mental Status:  · Orientation: grossly oriented to person, place and time  · Mood and Affect: mood normal, affect appropriate    . Assessment:  Routine gynecologic examination  Her current medical status is satisfactory with no evidence of significant gynecologic issues.   Folliculitis  Likely normal soft tissue under arm  Plan:  Counseled re: diet, exercise, healthy lifestyle  Return for yearly wellness visits  Rec annual mammogram  Doxycyline 100mg bid x 10d

## 2020-09-10 RX ORDER — FLUCONAZOLE 150 MG/1
150 TABLET ORAL DAILY
Qty: 1 TAB | Refills: 1 | Status: SHIPPED | OUTPATIENT
Start: 2020-09-10 | End: 2021-04-29

## 2020-09-23 ENCOUNTER — OFFICE VISIT (OUTPATIENT)
Dept: OBGYN CLINIC | Age: 50
End: 2020-09-23

## 2021-04-29 ENCOUNTER — APPOINTMENT (OUTPATIENT)
Dept: GENERAL RADIOLOGY | Age: 51
End: 2021-04-29
Attending: STUDENT IN AN ORGANIZED HEALTH CARE EDUCATION/TRAINING PROGRAM
Payer: COMMERCIAL

## 2021-04-29 ENCOUNTER — APPOINTMENT (OUTPATIENT)
Dept: ULTRASOUND IMAGING | Age: 51
End: 2021-04-29
Attending: STUDENT IN AN ORGANIZED HEALTH CARE EDUCATION/TRAINING PROGRAM
Payer: COMMERCIAL

## 2021-04-29 ENCOUNTER — HOSPITAL ENCOUNTER (EMERGENCY)
Age: 51
Discharge: HOME | End: 2021-04-29
Attending: STUDENT IN AN ORGANIZED HEALTH CARE EDUCATION/TRAINING PROGRAM
Payer: COMMERCIAL

## 2021-04-29 VITALS
SYSTOLIC BLOOD PRESSURE: 159 MMHG | TEMPERATURE: 97.9 F | BODY MASS INDEX: 35.42 KG/M2 | OXYGEN SATURATION: 100 % | DIASTOLIC BLOOD PRESSURE: 95 MMHG | WEIGHT: 239.86 LBS | HEART RATE: 82 BPM | RESPIRATION RATE: 16 BRPM

## 2021-04-29 DIAGNOSIS — M79.662 PAIN OF LEFT CALF: Primary | ICD-10-CM

## 2021-04-29 PROCEDURE — 99283 EMERGENCY DEPT VISIT LOW MDM: CPT

## 2021-04-29 PROCEDURE — 93971 EXTREMITY STUDY: CPT

## 2021-04-29 PROCEDURE — 73562 X-RAY EXAM OF KNEE 3: CPT

## 2021-04-29 RX ORDER — SPIRONOLACTONE 25 MG/1
25 TABLET ORAL DAILY
COMMUNITY

## 2021-04-29 NOTE — DISCHARGE INSTRUCTIONS
There is no evidence of blood clot in the leg,  fracture of knee. There appears to be normal-appearing cartilage. The swelling may be related to calf strain or other soft tissue issue. However if the swelling increases in the next week it may be necessary to have a repeat ultrasound in 5-7 days to evaluate any extension of a calf vein clot, which can be scheduled as an outpatient. Apply a compressive ACE bandage. Rest and elevate the affected painful area. Apply cold compresses intermittently as needed. As pain recedes, begin normal activities slowly as tolerated. Call if symptoms persist.            XR KNEE LT 3 V (Final result)  Result time 04/29/21 12:52:14  Final result by Radha Mclean MD (04/29/21 12:52:14)                Impression:    No acute abnormality. Narrative:    EXAM: XR KNEE LT 3 V     INDICATION: L knee pain and swelling. COMPARISON: None. FINDINGS: Three views of the left knee demonstrate no fracture or other acute   osseous or articular abnormality. There is no effusion. Interpretation Summary    No evidence of acute deep vein thrombosis in the left common femoral, superficial femoral, popliteal, posterior tibial, and peroneal veins. The veins were imaged in the transverse and longitudinal planes. The vessels showed normal color filling and compressibility. Doppler interrogation showed phasic and spontaneous flow. Lower Extremity Venous Findings    Left Lower Venous    No evidence of deep vein thrombosis in the common femoral, profunda femoral, femoral, popliteal, posterior tibial, and peroneal veins. The veins were imaged in the transverse and longitudinal planes. The vessels showed normal color filling and compressibility. Doppler interrogation showed phasic and spontaneous flow.        The common femoral, great saphenous, proximal femoral, mid femoral, distal femoral, popliteal and posterior tibial vein(s) were imaged in the transverse and longitudinal planes. The vessels showed normal color filling and compressibility. Doppler interrogation of the veins showed phasic and spontaneous flow.                            Procedure Staff    Technologist/Clinician: Ishan Wharton  Supporting Staff: None  Performing Physician/Midlevel: None     Exam Completion Date/Time: 4/29/21 12:27 PM

## 2021-04-29 NOTE — ED PROVIDER NOTES
Cesario Giang is a 48 y.o. female with past medical history notable for copd, hsv, hypertension, works as a  in office presenting with calf pain and swelling and knee swelling. States that she has been treated by podiatry for the past month for metatarsalgia and heel pain, states that her podiatrist suspects that she has Achilles tendinopathy. She noticed pain in the calf over the past 2 days and noticed some swelling. She has some chronic lower extremity edema related to her antihypertensive medications which is been managed by her PCP. This is essentially unchanged but has noticed that the left calf and leg has been more swollen in the last week. As well she noticed that there has been some pain and swelling over the medial aspect of her posterior knee in the past 2 days. No fevers or chills. No specific injury. No falls. No history of surgery in the knee.   She states that she it was suspected that she had arthritis in her knee in the past.           Past Medical History:   Diagnosis Date    Chronic obstructive pulmonary disease ([facility] [address] 03/2018    Endometriosis     HSV (herpes simplex virus) infection     Hypertension        Past Surgical History:   Procedure Laterality Date    HX BARTHOLIN CYST MARSUPIALIZATION  4/09    HX RIGHT SALPINGO-OOPHORECTOMY  1/2000    [facility]/RSO    HX TOTAL ABDOMINAL HYSTERECTOMY  1/2000    RSO         Family History:   Problem Relation Age of Onset    Breast Cancer Mother     Diabetes Mother     Hypertension Mother     Breast Cancer Maternal Grandmother        Social History     Socioeconomic History    Marital status:      Spouse name: Not on file    Number of children: Not on file    Years of education: Not on file    Highest education level: Not on file   Occupational History    Not on file   Social Needs    Financial resource strain: Not on file    Food insecurity     Worry: Not on file     Inability: Not on file   [employer] needs Medical: Not on file     Non-medical: Not on file   Tobacco Use    Smoking status: Current Every Day Smoker     Packs/day: 0.50    Smokeless tobacco: Never Used   Substance and Sexual Activity    Alcohol use: Yes     Alcohol/week: 0.0 standard drinks     Comment: socially    Drug use: No    Sexual activity: Yes     Partners: Male     Birth control/protection: Surgical   Lifestyle    Physical activity     Days per week: Not on file     Minutes per session: Not on file    Stress: Not on file   Relationships    Social connections     Talks on phone: Not on file     Gets together: Not on file     Attends Sikh service: Not on file     Active member of club or organization: Not on file     Attends meetings of clubs or organizations: Not on file     Relationship status: Not on file    Intimate partner violence     Fear of current or ex partner: Not on file     Emotionally abused: Not on file     Physically abused: Not on file     Forced sexual activity: Not on file   Other Topics Concern    Not on file   Social History Narrative    Not on file         ALLERGIES: Percocet [oxycodone-acetaminophen]    Review of Systems   Constitutional: Negative for chills and fever. Eyes: Negative for photophobia. Respiratory: Negative for shortness of breath. Cardiovascular: Positive for leg swelling. Negative for chest pain. Gastrointestinal: Negative for abdominal pain. Genitourinary: Negative for dysuria. Musculoskeletal: Negative for back pain. Neurological: Negative for syncope, light-headedness and headaches. Psychiatric/Behavioral: Negative for confusion. All other systems reviewed and are negative. Vitals:    04/29/21 1145   BP: (!) 168/87   Pulse: 91   Resp: 16   Temp: 97.9 °F (36.6 °C)   SpO2: 100%   Weight: 108.8 kg (239 lb 13.8 oz)            Physical Exam  Constitutional:       General: She is not in acute distress. Appearance: She is not toxic-appearing.    HENT:      Head: Normocephalic and atraumatic. Mouth/Throat:      Mouth: Mucous membranes are moist.   Eyes:      Extraocular Movements: Extraocular movements intact. Neck:      Musculoskeletal: Normal range of motion. Cardiovascular:      Rate and Rhythm: Normal rate and regular rhythm. Heart sounds: Normal heart sounds. Pulmonary:      Effort: Pulmonary effort is normal. No respiratory distress. Breath sounds: Normal breath sounds. Abdominal:      Palpations: Abdomen is soft. Tenderness: There is no abdominal tenderness. Musculoskeletal:      Right lower leg: Edema present. Left lower leg: Edema present. Legs:       Comments: Trace bilateral lower extremity edema  Left calf appears slightly swollen in comparison to the right. 2+ DP and PT pulses bilateral, symmetric, cap refill less than 2   Skin:     Capillary Refill: Capillary refill takes less than 2 seconds. Neurological:      General: No focal deficit present. Mental Status: She is alert and oriented to person, place, and time. Psychiatric:         Mood and Affect: Mood normal.          MDM  Number of Diagnoses or Management Options          Patient with leg swelling, unclear etiology. Does not have any evidence of vascular insufficiency. She also recalls a history of swelling around the elbow, upper arm transiently in the past.  She does not have any history of congestive heart failure nor stigmata of this condition at this time. She has had intermittent swelling over several months to years. Advised that she follow-up with primary care and possibly a specialist such as rheumatology as this may be an exacerbation of an as yet unknown diagnosis. That said there is no evidence immediately life-threatening condition such as severe infection, vascular occlusion, heart failure.     Procedures

## 2021-04-29 NOTE — ED TRIAGE NOTES
Pt reports having pain and swelling in her left leg since Tuesday. Pt was seen at [facility] and was told to come to ED.

## 2021-09-03 ENCOUNTER — TRANSCRIBE ORDER (OUTPATIENT)
Dept: REGISTRATION | Age: 51
End: 2021-09-03

## 2021-09-03 DIAGNOSIS — Z00.00 PREVENTATIVE HEALTH CARE: Primary | ICD-10-CM

## 2021-09-13 ENCOUNTER — HOSPITAL ENCOUNTER (OUTPATIENT)
Dept: CT IMAGING | Age: 51
Discharge: HOME | End: 2021-09-13
Payer: SELF-PAY

## 2021-09-13 DIAGNOSIS — Z00.00 PREVENTATIVE HEALTH CARE: ICD-10-CM

## 2021-09-13 PROCEDURE — 75571 CT HRT W/O DYE W/CA TEST: CPT

## 2021-10-21 ENCOUNTER — TELEPHONE (OUTPATIENT)
Dept: OBGYN CLINIC | Age: 51
End: 2021-10-21

## 2021-10-21 NOTE — TELEPHONE ENCOUNTER
Give Diflucan 150mg x 1 - advised patient we usually require an appt but I am not in tomorrow.  If not better needs to be seen

## 2021-10-21 NOTE — TELEPHONE ENCOUNTER
Pt calling with c/o a yeast infection. Pt would like a Rx for diflucan. This RN advised pt of Monistat, but pt states she can't use it.      Please advise

## 2021-10-22 RX ORDER — FLUCONAZOLE 150 MG/1
150 TABLET ORAL DAILY
Qty: 1 TABLET | Refills: 0 | Status: SHIPPED | OUTPATIENT
Start: 2021-10-22 | End: 2021-10-23

## 2022-05-28 ENCOUNTER — HOSPITAL ENCOUNTER (EMERGENCY)
Age: 52
Discharge: HOME | End: 2022-05-28
Attending: EMERGENCY MEDICINE
Payer: COMMERCIAL

## 2022-05-28 ENCOUNTER — APPOINTMENT (OUTPATIENT)
Dept: CT IMAGING | Age: 52
End: 2022-05-28
Attending: EMERGENCY MEDICINE
Payer: COMMERCIAL

## 2022-05-28 VITALS
HEIGHT: 69 IN | TEMPERATURE: 97 F | OXYGEN SATURATION: 99 % | HEART RATE: 82 BPM | BODY MASS INDEX: 34.45 KG/M2 | RESPIRATION RATE: 16 BRPM | DIASTOLIC BLOOD PRESSURE: 64 MMHG | SYSTOLIC BLOOD PRESSURE: 122 MMHG | WEIGHT: 232.59 LBS

## 2022-05-28 DIAGNOSIS — R10.32 ABDOMINAL PAIN, LLQ (LEFT LOWER QUADRANT): Primary | ICD-10-CM

## 2022-05-28 DIAGNOSIS — K92.1 BLOOD PRESENT IN STOOL: ICD-10-CM

## 2022-05-28 DIAGNOSIS — R19.7 DIARRHEA, UNSPECIFIED TYPE: ICD-10-CM

## 2022-05-28 LAB
ALBUMIN SERPL-MCNC: 3.6 G/DL (ref 3.5–5)
ALBUMIN/GLOB SERPL: 1.2 {RATIO} (ref 1.1–2.2)
ALP SERPL-CCNC: 60 U/L (ref 45–117)
ALT SERPL-CCNC: 40 U/L (ref 12–78)
ANION GAP SERPL CALC-SCNC: 7 MMOL/L (ref 5–15)
APPEARANCE UR: CLEAR
AST SERPL-CCNC: 21 U/L (ref 15–37)
BASOPHILS # BLD: 0 K/UL (ref 0–0.1)
BASOPHILS NFR BLD: 1 % (ref 0–1)
BILIRUB SERPL-MCNC: 0.4 MG/DL (ref 0.2–1)
BILIRUB UR QL: NEGATIVE
BUN SERPL-MCNC: 15 MG/DL (ref 6–20)
BUN/CREAT SERPL: 13 (ref 12–20)
CALCIUM SERPL-MCNC: 9.4 MG/DL (ref 8.5–10.1)
CHLORIDE SERPL-SCNC: 106 MMOL/L (ref 97–108)
CO2 SERPL-SCNC: 26 MMOL/L (ref 21–32)
COLOR UR: NORMAL
CREAT SERPL-MCNC: 1.15 MG/DL (ref 0.55–1.02)
DIFFERENTIAL METHOD BLD: NORMAL
EOSINOPHIL # BLD: 0.1 K/UL (ref 0–0.4)
EOSINOPHIL NFR BLD: 1 % (ref 0–7)
ERYTHROCYTE [DISTWIDTH] IN BLOOD BY AUTOMATED COUNT: 12.5 % (ref 11.5–14.5)
GLOBULIN SER CALC-MCNC: 3 G/DL (ref 2–4)
GLUCOSE SERPL-MCNC: 119 MG/DL (ref 65–100)
GLUCOSE UR STRIP.AUTO-MCNC: NEGATIVE MG/DL
HCT VFR BLD AUTO: 38 % (ref 35–47)
HEMOCCULT STL QL: NORMAL
HGB BLD-MCNC: 13 G/DL (ref 11.5–16)
HGB UR QL STRIP: NEGATIVE
IMM GRANULOCYTES # BLD AUTO: 0 K/UL (ref 0–0.04)
IMM GRANULOCYTES NFR BLD AUTO: 0 % (ref 0–0.5)
KETONES UR QL STRIP.AUTO: NEGATIVE MG/DL
LACTATE SERPL-SCNC: 1.3 MMOL/L (ref 0.4–2)
LACTATE SERPL-SCNC: 2.8 MMOL/L (ref 0.4–2)
LEUKOCYTE ESTERASE UR QL STRIP.AUTO: NEGATIVE
LIPASE SERPL-CCNC: 124 U/L (ref 73–393)
LYMPHOCYTES # BLD: 1.2 K/UL (ref 0.8–3.5)
LYMPHOCYTES NFR BLD: 20 % (ref 12–49)
MCH RBC QN AUTO: 32.3 PG (ref 26–34)
MCHC RBC AUTO-ENTMCNC: 34.2 G/DL (ref 30–36.5)
MCV RBC AUTO: 94.3 FL (ref 80–99)
MONOCYTES # BLD: 0.6 K/UL (ref 0–1)
MONOCYTES NFR BLD: 9 % (ref 5–13)
NEUTS SEG # BLD: 4.2 K/UL (ref 1.8–8)
NEUTS SEG NFR BLD: 69 % (ref 32–75)
NITRITE UR QL STRIP.AUTO: NEGATIVE
NRBC # BLD: 0 K/UL (ref 0–0.01)
NRBC BLD-RTO: 0 PER 100 WBC
PH UR STRIP: 6 [PH] (ref 5–8)
PLATELET # BLD AUTO: 217 K/UL (ref 150–400)
PMV BLD AUTO: 10.2 FL (ref 8.9–12.9)
POTASSIUM SERPL-SCNC: 3.8 MMOL/L (ref 3.5–5.1)
PROT SERPL-MCNC: 6.6 G/DL (ref 6.4–8.2)
PROT UR STRIP-MCNC: NEGATIVE MG/DL
RBC # BLD AUTO: 4.03 M/UL (ref 3.8–5.2)
SODIUM SERPL-SCNC: 139 MMOL/L (ref 136–145)
SP GR UR REFRACTOMETRY: <1.005 (ref 1–1.03)
UROBILINOGEN UR QL STRIP.AUTO: 0.2 EU/DL (ref 0.2–1)
WBC # BLD AUTO: 6.1 K/UL (ref 3.6–11)

## 2022-05-28 PROCEDURE — 85025 COMPLETE CBC W/AUTO DIFF WBC: CPT

## 2022-05-28 PROCEDURE — 36415 COLL VENOUS BLD VENIPUNCTURE: CPT

## 2022-05-28 PROCEDURE — 74011250636 HC RX REV CODE- 250/636: Performed by: EMERGENCY MEDICINE

## 2022-05-28 PROCEDURE — 83605 ASSAY OF LACTIC ACID: CPT

## 2022-05-28 PROCEDURE — 80053 COMPREHEN METABOLIC PANEL: CPT

## 2022-05-28 PROCEDURE — 74177 CT ABD & PELVIS W/CONTRAST: CPT

## 2022-05-28 PROCEDURE — 74011000636 HC RX REV CODE- 636: Performed by: EMERGENCY MEDICINE

## 2022-05-28 PROCEDURE — 81003 URINALYSIS AUTO W/O SCOPE: CPT

## 2022-05-28 PROCEDURE — 99285 EMERGENCY DEPT VISIT HI MDM: CPT

## 2022-05-28 PROCEDURE — 82272 OCCULT BLD FECES 1-3 TESTS: CPT

## 2022-05-28 PROCEDURE — 83690 ASSAY OF LIPASE: CPT

## 2022-05-28 RX ADMIN — IOPAMIDOL 80 ML: 755 INJECTION, SOLUTION INTRAVENOUS at 10:04

## 2022-05-28 RX ADMIN — SODIUM CHLORIDE, POTASSIUM CHLORIDE, SODIUM LACTATE AND CALCIUM CHLORIDE 1000 ML: 600; 310; 30; 20 INJECTION, SOLUTION INTRAVENOUS at 09:01

## 2022-05-28 NOTE — Clinical Note
[address] EMERGENCY DEPT  [address] [id number]  [phone]    Work/School Note    Date: 5/28/2022    To Whom It May concern:    Elyse Faustin was seen and treated today in the emergency room by the following provider(s):  Attending Provider: Zulema Kingston DO. Elyse Faustin is excused from work/school on 05/28/22 and 05/29/22. She is medically clear to return to work/school on 5/30/2022.        Sincerely,          Jimmy Juarez DO

## 2022-05-28 NOTE — ED PROVIDER NOTES
Patient is here because of abdominal pain as well as bloody stool. She states that approximately 3 days ago she had a gradual onset of intermittent pain initially in the upper abdomen which then localized to the left lower quadrant. She said several episodes of loose stool that initially was brown and then became gross blood. She took Pepto-Bismol for this. Last dose was yesterday evening. She had 2 episodes of small amount of stool today which she said was black with blood streaks on it. Initially had nausea but no vomiting. No vomiting or nausea at this time. States that all the food that she ate was similar to food that other people ate. No one else has similar symptoms. No history of diverticulitis. Colonoscopy 3 years ago which resulted in removal of several polyps. No fever chills weight loss. No history of colon cancer. No history of irritable bowel, inflammatory bowel, Crohn's disease, ulcerative colitis. Denies any other symptoms with this. Did not take anything else. Is on cefdinir for an sinus infection. No history of C. difficile colitis. No recent travel. No eating a peanut butter, which she has recently been linked to a Salmonella outbreak.            Past Medical History:   Diagnosis Date    Chronic obstructive pulmonary disease ([facility] [address] 03/2018    Endometriosis     HSV (herpes simplex virus) infection     Hypertension        Past Surgical History:   Procedure Laterality Date    HX BARTHOLIN CYST MARSUPIALIZATION  4/09    HX RIGHT SALPINGO-OOPHORECTOMY  1/2000    [place]/RSO    HX TOTAL ABDOMINAL HYSTERECTOMY  1/2000    RSO         Family History:   Problem Relation Age of Onset    Breast Cancer Mother     Diabetes Mother     Hypertension Mother     Breast Cancer Maternal Grandmother        Social History     Socioeconomic History    Marital status:      Spouse name: Not on file    Number of children: Not on file    Years of education: Not on file    Highest education level: Not on file   Occupational History    Not on file   Tobacco Use    Smoking status: Current Every Day Smoker     Packs/day: 0.50    Smokeless tobacco: Never Used   Substance and Sexual Activity    Alcohol use: Yes     Alcohol/week: 0.0 standard drinks     Comment: socially    Drug use: No    Sexual activity: Yes     Partners: Male     Birth control/protection: Surgical   Other Topics Concern    Not on file   Social History Narrative    Not on file     Social Determinants of Health     Financial Resource Strain:     Difficulty of Paying Living Expenses: Not on file   Food Insecurity:     Worried About Running Out of Food in the Last Year: Not on file    Juan of Food in the Last Year: Not on file   Transportation Needs:     Lack of Transportation (Medical): Not on file    Lack of Transportation (Non-Medical): Not on file   Physical Activity:     Days of Exercise per Week: Not on file    Minutes of Exercise per Session: Not on file   Stress:     Feeling of Stress : Not on file   Social Connections:     Frequency of Communication with Friends and Family: Not on file    Frequency of Social Gatherings with Friends and Family: Not on file    Attends Scientologist Services: Not on file    Active Member of [address] [place] [address] or Organizations: Not on file    Attends Club or Organization Meetings: Not on file    Marital Status: Not on file   Intimate Partner Violence:     Fear of Current or Ex-Partner: Not on file    Emotionally Abused: Not on file    Physically Abused: Not on file    Sexually Abused: Not on file   Housing Stability:     Unable to Pay for Housing in the Last Year: Not on file    Number of [place] in the Last Year: Not on file    Unstable Housing in the Last Year: Not on file         ALLERGIES: Percocet [oxycodone-acetaminophen]    Review of Systems   Constitutional: Negative for chills, diaphoresis, fatigue and fever. HENT: Negative for rhinorrhea and sore throat.     Eyes: Negative for discharge and itching. Respiratory: Negative for cough, chest tightness and shortness of breath. Cardiovascular: Negative for chest pain, palpitations and leg swelling. Gastrointestinal: Positive for abdominal pain, blood in stool and nausea. Negative for abdominal distention, constipation, diarrhea and vomiting. Genitourinary: Negative for dysuria, flank pain and hematuria. Musculoskeletal: Negative for arthralgias, back pain, gait problem, joint swelling, myalgias, neck pain and neck stiffness. Skin: Negative for color change, pallor, rash and wound. Neurological: Negative for dizziness, syncope, light-headedness, numbness and headaches. Vitals:    05/28/22 0830 05/28/22 0900   BP: (!) 174/76 122/64   Pulse: 99 82   Resp: 18 16   Temp: 97 °F (36.1 °C)    SpO2: 98% 99%   Weight: 105.5 kg (232 lb 9.4 oz)    Height: 5' 9\" (1.753 m)             Physical Exam  Vitals and nursing note reviewed. Exam conducted with a chaperone present. Constitutional:       General: She is not in acute distress. Appearance: She is well-developed and normal weight. She is not ill-appearing, toxic-appearing or diaphoretic. HENT:      Head: Normocephalic and atraumatic. Mouth/Throat:      Mouth: Mucous membranes are moist.      Pharynx: Oropharynx is clear. No pharyngeal swelling or oropharyngeal exudate. Eyes:      General: No scleral icterus. Extraocular Movements: Extraocular movements intact. Pupils: Pupils are equal, round, and reactive to light. Cardiovascular:      Rate and Rhythm: Normal rate and regular rhythm. Heart sounds: Normal heart sounds. Pulmonary:      Effort: Pulmonary effort is normal.      Breath sounds: Normal breath sounds. Abdominal:      General: Abdomen is flat. Bowel sounds are normal.      Palpations: Abdomen is soft. Tenderness: There is abdominal tenderness in the left lower quadrant. There is no guarding or rebound.  Negative signs include Forrest's sign, Rovsing's sign and McBurney's sign. Genitourinary:     Rectum: No tenderness. Comments: Rectal exam with several external nonthrombosed hemorrhoids. Nontender. No internal masses palpated. Soft dark brown stool. Skin:     General: Skin is warm and dry. Neurological:      General: No focal deficit present. Mental Status: She is alert and oriented to person, place, and time. Psychiatric:         Mood and Affect: Mood normal.         Behavior: Behavior normal.          [facility]  ED Course as of 05/28/22 1150   Sat May 28, 2022   0843 Patient examined. With hypertension otherwise vital signs normal.  Tenderness palpation left lower quadrant. Nonperitoneal abdomen. Rectal exam with external nonthrombosed hemorrhoids. Dark brown stool. Will send for fecal occult blood test.  Concern for possible diverticulitis versus colitis. Will get CT scan. Will get labs. [AF]   0920 CBC WITH AUTOMATED DIFF:    WBC 6.1   RBC 4.03   HGB 13.0   HCT 38.0   MCV 94.3   MCH 32.3   MCHC 34.2   RDW 12.5   PLATELET 087   MPV 01.9   NRBC 0.0   ABSOLUTE NRBC 0.00   NEUTROPHILS 69   LYMPHOCYTES 20   MONOCYTES 9   EOSINOPHILS 1   BASOPHILS 1   IMMATURE GRANULOCYTES 0   ABS. NEUTROPHILS 4.2   ABS. LYMPHOCYTES 1.2   ABS. MONOCYTES 0.6   ABS. EOSINOPHILS 0.1   ABS. BASOPHILS 0.0   ABS. IMM.  GRANS. 0.0   DF AUTOMATED [AF]   0920 OCCULT BLOOD, STOOL:    Occult blood, stool TRACE [AF]   0942 URINALYSIS W/ RFLX MICROSCOPIC:    Color YELLOW/STRAW   Appearance CLEAR   Specific gravity <1.005   pH (UA) 6.0   Protein Negative   Glucose Negative   Ketone Negative   Bilirubin Negative   Blood Negative   Urobilinogen 0.2   Nitrites Negative   Leukocyte Esterase Negative [AF]   0948 LIPASE:    Lipase 124 [AF]   0949 COMPREHENSIVE METABOLIC PANEL(!):    Sodium 139   Potassium 3.8   Chloride 106   CO2 26   Anion gap 7   Glucose 119(!)   BUN 15   Creatinine 1.15(!)   BUN/Creatinine ratio 13   GFR est AA >60   GFR est non-AA 50(!) Calcium 9.4   Bilirubin, total 0.4   ALT 40   AST 21   Alk. phosphatase 60   Protein, total 6.6   Albumin 3.6   Globulin 3.0   A-G Ratio 1.2 [AF]   1021 LACTIC ACID, PLASMA(!!):    Lactic acid 2.8(!!) [AF]   1056 CT ABD PELV W CONT [AF]   1110 Patient rechecked. Patient no acute distress. No complaints at this time. Getting repeat lactate level. Told patient that if this is due to a viral bacterial infection it should get better over the next few days. No need for antibiotics at this time. Patient states that she cannot give a stool sample this time for comprehensive stool study. Patient told that if things were to worsen or change come back to the emergency department immediately. Patient told that she is to follow-up with primary care doctor. Patient told that if symptoms persist for more than 1 week from time of onset then there is to be consideration for possible reevaluation of antibiotics or other etiologies such as colon cancer. [AF]   1146 LACTIC ACID:    Lactic acid 1.3 [AF]   1148 Repeat lactate within normal limits. Patient rechecked. Patient no acute distress. No nausea vomiting diarrhea here. Unable to get stool sample. Will have patient follow-up with primary care doctor. Return to ED instructions provided. Patient told that if symptoms were to worsen or change to return to ED immediately. Patient verbalized understanding and agreement with plan.  [AF]      ED Course User Index  [AF] Parisa Serrano DO       Procedures

## 2022-05-28 NOTE — ED NOTES
The patient was discharged home in stable condition. The patient is alert and oriented, in no respiratory distress. The patient's diagnosis, condition and treatment were explained. The patient expressed understanding. No prescriptions given. A work/school note given. A discharge plan has been developed. A  was not involved in the process. Aftercare instructions were given. Pt ambulatory out of the ED  with family.

## 2022-05-28 NOTE — DISCHARGE INSTRUCTIONS
Your CT scan did not show an acute process. Your lactic acid was mildly elevated which resolved after IV fluids. If your diarrhea and abdominal pain and blood in the stool worsens return to the emergency department immediately. Follow-up with your primary care doctor.

## 2022-05-28 NOTE — ED TRIAGE NOTES
Pt ambulated to the treatment area with a steady gait. Pt states \"Thursday I had something to eat and developed sharp pain in my stomach after that I started having bloody stools. I vomited once yesterday I am still nauseated today\" Pt appears in no distress.

## 2022-05-28 NOTE — Clinical Note
[address] EMERGENCY DEPT  [address] [id number]  [phone]    Work/School Note    Date: 5/28/2022    To Whom It May concern:    Kamla Dimas was seen and treated today in the emergency room by the following provider(s):  Attending Provider: Justin Gallegos DO. Kamla Dimas is excused from work/school on 05/28/22 and 05/29/22. She is medically clear to return to work/school on 5/30/2022.        Sincerely,          Yo Hobbs DO

## 2022-06-28 NOTE — PROGRESS NOTES
Anita Sharpe is a [de-identified] P5,  46 y.o. female WHITE/NON- whose No LMP recorded. Patient has had a hysterectomy. who presents for her annual checkup. She is having no significant problems. She is complaining of urinary incontinence mostly stress related. Hormone Status:    She is not having vasomotor symptoms. The patient is using HRT: none    Sexual history:    She  reports being sexually active and has had partner(s) who are male. She reports using the following method of birth control/protection: Surgical.    Medical conditions:    Since her last annual GYN exam about two years ago, she has had the following changes in her health history: none. Pap and Mammogram History:    Her most recent Pap smear was normal obtained 3 year(s) ago. The patient had her mammogram today in our office. Breast Cancer History/Substance Abuse:    She has no and a family history of breast cancer. Osteoporosis History:    Family history does not include a first or second degree relative with osteopenia or osteoporosis. A bone density scan was  not obtained. .   She is currently taking calcium and vit D. Past Medical History:   Diagnosis Date    Chronic obstructive pulmonary disease ([address] 03/2018    Endometriosis     HSV (herpes simplex virus) infection     Hypertension      Past Surgical History:   Procedure Laterality Date    HX BARTHOLIN CYST MARSUPIALIZATION  4/09    HX RIGHT SALPINGO-OOPHORECTOMY  1/2000    [place]/RSO    HX TOTAL ABDOMINAL HYSTERECTOMY  1/2000    RSO     Tobacco History:  reports that she has been smoking. She has been smoking about 0.50 packs per day. She has never used smokeless tobacco.  Alcohol Abuse:  reports current alcohol use. Drug Abuse:  reports no history of drug use. Current Outpatient Medications   Medication Sig Dispense Refill    spironolactone (ALDACTONE) 25 mg tablet Take 25 mg by mouth daily.       DULERA 100-5 mcg/actuation HFA inhaler TAKE 2 PUFFS EVERY 12 HOURS  4     Allergies: Percocet [oxycodone-acetaminophen]   Social History     Socioeconomic History    Marital status:      Spouse name: Not on file    Number of children: Not on file    Years of education: Not on file    Highest education level: Not on file   Occupational History    Not on file   Tobacco Use    Smoking status: Current Every Day Smoker     Packs/day: 0.50    Smokeless tobacco: Never Used   Substance and Sexual Activity    Alcohol use: Yes     Alcohol/week: 0.0 standard drinks     Comment: socially    Drug use: No    Sexual activity: Yes     Partners: Male     Birth control/protection: Surgical   Other Topics Concern    Not on file   Social History Narrative    Not on file     Social Determinants of Health     Financial Resource Strain:     Difficulty of Paying Living Expenses: Not on file   Food Insecurity:     Worried About Running Out of Food in the Last Year: Not on file    Juan of Food in the Last Year: Not on file   Transportation Needs:     Lack of Transportation (Medical): Not on file    Lack of Transportation (Non-Medical):  Not on file   Physical Activity:     Days of Exercise per Week: Not on file    Minutes of Exercise per Session: Not on file   Stress:     Feeling of Stress : Not on file   Social Connections:     Frequency of Communication with Friends and Family: Not on file    Frequency of Social Gatherings with Friends and Family: Not on file    Attends Church Services: Not on file    Active Member of Clubs or Organizations: Not on file    Attends Club or Organization Meetings: Not on file    Marital Status: Not on file   Intimate Partner Violence:     Fear of Current or Ex-Partner: Not on file    Emotionally Abused: Not on file    Physically Abused: Not on file    Sexually Abused: Not on file   Housing Stability:     Unable to Pay for Housing in the Last Year: Not on file    Number of [place] in the Last Year: Not on file  Unstable Housing in the Last Year: Not on file     Patient Active Problem List   Diagnosis Code    Ectopic breast tissue Q83.8       Review of Systems - History obtained from the patient  ROS     Physical Exam    There were no vitals taken for this visit.   Constitutional  · Appearance: well-nourished, well developed, alert, in no acute distress    HENT  · Head and Face: appears normal    Neck  · Inspection/Palpation: normal appearance, no masses or tenderness  · Lymph Nodes: no lymphadenopathy present  · Thyroid: gland size normal, nontender, no nodules or masses present on palpation    Chest  · Respiratory Effort: breathing normal        Auscultation: normal breath sounds    Cardiovascular  · Heart:  · Auscultation: regular rate and rhythm without murmur    Breasts  · Inspection of Breasts: breasts symmetrical, no skin changes, no discharge present, nipple appearance normal, no skin retraction present  · Palpation of Breasts and Axillae: no masses present on palpation, no breast tenderness  · Axillary Lymph Nodes: no lymphadenopathy present    Gastrointestinal  · Abdominal Examination: abdomen non-tender to palpation, normal bowel sounds, no masses present  · Liver and spleen: no hepatomegaly present, spleen not palpable  · Hernias: no hernias identified    Genitourinary  · External Genitalia: normal appearance for age, no discharge present, no tenderness present, no inflammatory lesions present, no masses present, no atrophy present  · Vagina: normal vaginal vault without central or paravaginal defects, no discharge present, no inflammatory lesions present, no masses present, rectocele  · Bladder: non-tender to palpation  · Urethra: appears normal  · Cervix: absent  · Uterus: absent  · Adnexa: no adnexal tenderness present, no adnexal masses present  · Perineum: perineum within normal limits, no evidence of trauma, no rashes or skin lesions present  · Anus: anus within normal limits, no hemorrhoids present  · Inguinal Lymph Nodes: no lymphadenopathy present      Skin  · General Inspection: no rash, no lesions identified    Neurologic/Psychiatric  · Mental Status:  · Orientation: grossly oriented to person, place and time  · Mood and Affect: mood normal, affect appropriate    . Assessment:  Routine gynecologic examination  Her current medical status is satisfactory with no evidence of significant gynecologic issues.   incontinence  Plan:  Counseled re: diet, exercise, healthy lifestyle  Return for yearly wellness visits  Rec annual mammogram  Pap  See Carissa Valencia for urodynamics

## 2022-06-29 ENCOUNTER — OFFICE VISIT (OUTPATIENT)
Dept: OBGYN CLINIC | Age: 52
End: 2022-06-29

## 2022-06-29 VITALS
SYSTOLIC BLOOD PRESSURE: 140 MMHG | WEIGHT: 228.6 LBS | HEART RATE: 111 BPM | DIASTOLIC BLOOD PRESSURE: 82 MMHG | BODY MASS INDEX: 33.76 KG/M2

## 2022-06-29 DIAGNOSIS — Z12.4 SCREENING FOR CERVICAL CANCER: ICD-10-CM

## 2022-06-29 DIAGNOSIS — Z01.419 ENCOUNTER FOR GYNECOLOGICAL EXAMINATION (GENERAL) (ROUTINE) WITHOUT ABNORMAL FINDINGS: Primary | ICD-10-CM

## 2022-06-29 PROCEDURE — 99396 PREV VISIT EST AGE 40-64: CPT | Performed by: OBSTETRICS & GYNECOLOGY

## 2022-06-29 RX ORDER — ERGOCALCIFEROL 1.25 MG/1
CAPSULE ORAL
COMMUNITY
Start: 2022-06-27

## 2022-06-29 RX ORDER — BUPROPION HYDROCHLORIDE 150 MG/1
TABLET ORAL
COMMUNITY

## 2022-06-29 RX ORDER — VALACYCLOVIR HYDROCHLORIDE 500 MG/1
TABLET, FILM COATED ORAL
COMMUNITY

## 2022-06-29 RX ORDER — ERGOCALCIFEROL 1.25 MG/1
CAPSULE ORAL
COMMUNITY

## 2022-06-30 ENCOUNTER — TELEPHONE (OUTPATIENT)
Dept: OBGYN CLINIC | Age: 52
End: 2022-06-30

## 2022-06-30 NOTE — TELEPHONE ENCOUNTER
Per Dr. Ashlee Whyte and spoke with patient regarding recent mammogram results. Per MD, mammogram nl    Patient verbalized understanding and has no questions at this time.

## 2022-09-20 ENCOUNTER — TRANSCRIBE ORDER (OUTPATIENT)
Dept: SCHEDULING | Age: 52
End: 2022-09-20

## 2022-09-20 DIAGNOSIS — Z72.0 TOBACCO USE: Primary | ICD-10-CM

## 2022-09-22 ENCOUNTER — TRANSCRIBE ORDER (OUTPATIENT)
Dept: SCHEDULING | Age: 52
End: 2022-09-22

## 2022-09-22 DIAGNOSIS — Z72.0 TOBACCO ABUSE: Primary | ICD-10-CM

## 2022-10-10 ENCOUNTER — HOSPITAL ENCOUNTER (OUTPATIENT)
Dept: CT IMAGING | Age: 52
Discharge: HOME | End: 2022-10-10
Attending: OBSTETRICS & GYNECOLOGY
Payer: COMMERCIAL

## 2022-10-10 VITALS — BODY MASS INDEX: 33.33 KG/M2 | HEIGHT: 69 IN | WEIGHT: 225 LBS

## 2022-10-10 DIAGNOSIS — Z72.0 TOBACCO ABUSE: ICD-10-CM

## 2022-10-10 PROCEDURE — 71271 CT THORAX LUNG CANCER SCR C-: CPT

## 2023-06-27 NOTE — CARDIO/PULMONARY
Reached patient at her given mobile number. Patient states she has seen her coronary artery CT score of one on Total [place]. Discussed the meaning of this score. Patient plans to follow up with her ObGyn, Dr. Hero Sparks, and requests that I fax a copy of this report to his office - which I have done. Patient has no further questions at this time.
Opt out

## 2023-11-17 ENCOUNTER — TRANSCRIBE ORDERS (OUTPATIENT)
Facility: HOSPITAL | Age: 53
End: 2023-11-17

## 2023-11-17 DIAGNOSIS — K21.9 CHRONIC GERD: Primary | ICD-10-CM

## 2023-12-13 ENCOUNTER — HOSPITAL ENCOUNTER (OUTPATIENT)
Facility: HOSPITAL | Age: 53
Discharge: HOME | End: 2023-12-16
Payer: COMMERCIAL

## 2023-12-13 DIAGNOSIS — K21.9 CHRONIC GERD: ICD-10-CM

## 2023-12-13 DIAGNOSIS — R10.31 RIGHT LOWER QUADRANT PAIN: ICD-10-CM

## 2023-12-13 PROCEDURE — 74220 X-RAY XM ESOPHAGUS 1CNTRST: CPT

## 2023-12-13 PROCEDURE — 74177 CT ABD & PELVIS W/CONTRAST: CPT

## 2023-12-13 PROCEDURE — 6360000004 HC RX CONTRAST MEDICATION: Performed by: FAMILY MEDICINE

## 2023-12-13 RX ADMIN — IOPAMIDOL 100 ML: 755 INJECTION, SOLUTION INTRAVENOUS at 07:49

## 2024-01-05 ENCOUNTER — HOSPITAL ENCOUNTER (OUTPATIENT)
Facility: HOSPITAL | Age: 54
Discharge: HOME | End: 2024-01-05
Payer: COMMERCIAL

## 2024-01-05 VITALS — BODY MASS INDEX: 32.19 KG/M2 | WEIGHT: 218 LBS

## 2024-01-05 DIAGNOSIS — M46.92 CERVICAL SPONDYLITIS (HCC): ICD-10-CM

## 2024-01-05 PROCEDURE — 72141 MRI NECK SPINE W/O DYE: CPT

## 2025-03-23 ENCOUNTER — TRANSCRIBE ORDERS (OUTPATIENT)
Facility: HOSPITAL | Age: 55
End: 2025-03-23

## 2025-03-23 DIAGNOSIS — Z91.89 OTHER SPECIFIED PERSONAL RISK FACTORS, NOT ELSEWHERE CLASSIFIED: Primary | ICD-10-CM

## 2025-06-05 ENCOUNTER — TRANSCRIBE ORDERS (OUTPATIENT)
Facility: HOSPITAL | Age: 55
End: 2025-06-05

## 2025-06-05 DIAGNOSIS — H05.111 GRANULOMA OF RIGHT ORBIT: Primary | ICD-10-CM

## 2025-06-14 ENCOUNTER — HOSPITAL ENCOUNTER (OUTPATIENT)
Facility: HOSPITAL | Age: 55
Discharge: HOME | End: 2025-06-17
Attending: OPHTHALMOLOGY
Payer: COMMERCIAL

## 2025-06-14 VITALS — WEIGHT: 218 LBS | BODY MASS INDEX: 32.19 KG/M2

## 2025-06-14 DIAGNOSIS — H05.111 GRANULOMA OF RIGHT ORBIT: ICD-10-CM

## 2025-06-14 PROCEDURE — 70551 MRI BRAIN STEM W/O DYE: CPT

## 2025-06-14 RX ORDER — GADOTERIDOL 279.3 MG/ML
19 INJECTION INTRAVENOUS
Status: DISCONTINUED | OUTPATIENT
Start: 2025-06-14 | End: 2025-06-14